# Patient Record
Sex: FEMALE | Race: WHITE | Employment: STUDENT | URBAN - METROPOLITAN AREA
[De-identification: names, ages, dates, MRNs, and addresses within clinical notes are randomized per-mention and may not be internally consistent; named-entity substitution may affect disease eponyms.]

---

## 2017-03-07 DIAGNOSIS — Z30.011 INITIATION OF ORAL CONTRACEPTION: Primary | ICD-10-CM

## 2017-03-07 NOTE — TELEPHONE ENCOUNTER
Patient mother (on HIPPA) Yefridot Lin, requesting medication be sent as a 90 day supply VS a 30 day supply, sent to [facility] in [place]. RX was sent on 11/2016 for #3 pkg with 3 refills. Medication pended if ok to send #90.

## 2017-03-08 RX ORDER — NORETHINDRONE ACETATE AND ETHINYL ESTRADIOL 1; .02 MG/1; MG/1
1 TABLET ORAL DAILY
Qty: 90 TABLET | Refills: 1 | Status: SHIPPED | OUTPATIENT
Start: 2017-03-08 | End: 2018-01-30

## 2017-06-28 ENCOUNTER — LAB ENCOUNTER (OUTPATIENT)
Dept: LAB | Age: 22
End: 2017-06-28
Attending: INTERNAL MEDICINE
Payer: COMMERCIAL

## 2017-06-28 ENCOUNTER — OFFICE VISIT (OUTPATIENT)
Dept: FAMILY MEDICINE CLINIC | Facility: CLINIC | Age: 22
End: 2017-06-28

## 2017-06-28 VITALS
DIASTOLIC BLOOD PRESSURE: 70 MMHG | HEART RATE: 72 BPM | RESPIRATION RATE: 16 BRPM | BODY MASS INDEX: 24.7 KG/M2 | HEIGHT: 60 IN | SYSTOLIC BLOOD PRESSURE: 100 MMHG | TEMPERATURE: 98 F | WEIGHT: 125.81 LBS

## 2017-06-28 DIAGNOSIS — R11.2 NON-INTRACTABLE VOMITING WITH NAUSEA, UNSPECIFIED VOMITING TYPE: ICD-10-CM

## 2017-06-28 DIAGNOSIS — Z01.89 ROUTINE LAB DRAW: Primary | ICD-10-CM

## 2017-06-28 DIAGNOSIS — Z01.89 ROUTINE LAB DRAW: ICD-10-CM

## 2017-06-28 DIAGNOSIS — Z09 FOLLOW-UP EXAM: ICD-10-CM

## 2017-06-28 PROCEDURE — 85025 COMPLETE CBC W/AUTO DIFF WBC: CPT

## 2017-06-28 PROCEDURE — 81001 URINALYSIS AUTO W/SCOPE: CPT

## 2017-06-28 PROCEDURE — 36415 COLL VENOUS BLD VENIPUNCTURE: CPT

## 2017-06-28 PROCEDURE — 82306 VITAMIN D 25 HYDROXY: CPT

## 2017-06-28 PROCEDURE — 99213 OFFICE O/P EST LOW 20 MIN: CPT | Performed by: INTERNAL MEDICINE

## 2017-06-28 PROCEDURE — 84443 ASSAY THYROID STIM HORMONE: CPT

## 2017-06-28 PROCEDURE — 80061 LIPID PANEL: CPT

## 2017-06-28 PROCEDURE — 80053 COMPREHEN METABOLIC PANEL: CPT

## 2017-06-28 PROCEDURE — 87086 URINE CULTURE/COLONY COUNT: CPT

## 2017-06-28 NOTE — PROGRESS NOTES
CC: Patient presents with:  Lab  Follow - Up       HPI:     Feeling good overall  Got sick in [place]  About end of Jan    Woke up at 10 am and had severe low back pain and fever/ nausea. Then went away and never came back.    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   Return in about 6 months

## 2017-07-05 DIAGNOSIS — D64.9 ANEMIA, UNSPECIFIED TYPE: Primary | ICD-10-CM

## 2017-07-06 ENCOUNTER — APPOINTMENT (OUTPATIENT)
Dept: LAB | Age: 22
End: 2017-07-06
Attending: INTERNAL MEDICINE
Payer: COMMERCIAL

## 2017-07-06 DIAGNOSIS — D64.9 ANEMIA, UNSPECIFIED TYPE: ICD-10-CM

## 2017-07-06 LAB
DEPRECATED HBV CORE AB SER IA-ACNC: 6.4 NG/ML (ref 10–291)
IRON SATURATION: 18 % (ref 13–45)
IRON: 100 UG/DL (ref 28–170)
TOTAL IRON BINDING CAPACITY: 550 UG/DL (ref 298–536)
TRANSFERRIN: 369 MG/DL (ref 200–360)

## 2017-07-06 PROCEDURE — 82728 ASSAY OF FERRITIN: CPT

## 2017-07-06 PROCEDURE — 83540 ASSAY OF IRON: CPT

## 2017-07-06 PROCEDURE — 36415 COLL VENOUS BLD VENIPUNCTURE: CPT

## 2017-07-06 PROCEDURE — 83550 IRON BINDING TEST: CPT

## 2017-12-06 ENCOUNTER — OFFICE VISIT (OUTPATIENT)
Dept: FAMILY MEDICINE CLINIC | Facility: CLINIC | Age: 22
End: 2017-12-06

## 2017-12-06 DIAGNOSIS — Z02.9 ENCOUNTER FOR ADMINISTRATIVE EXAMINATIONS: Primary | ICD-10-CM

## 2017-12-06 NOTE — PROGRESS NOTES
Patient presents to walk in clinic after being at her nail salon. She normally has artificial nails placed and was told by the technician that she had a fungal infection on top of her left thumbnail. She is here today to see if she needs treatment.   Upon q

## 2017-12-26 DIAGNOSIS — D50.9 IRON DEFICIENCY ANEMIA, UNSPECIFIED IRON DEFICIENCY ANEMIA TYPE: Primary | ICD-10-CM

## 2017-12-26 NOTE — TELEPHONE ENCOUNTER
Requesting ferrous sulfate 325mg  LOV: 6/28/17  RTC:   Last Relevant Labs: 7/6/17  Filled: 7/7/17 # 30  with 5 refills    No future appointments.

## 2017-12-28 RX ORDER — FERROUS SULFATE 325(65) MG
325 TABLET ORAL
Qty: 30 TABLET | Refills: 0 | Status: CANCELLED | OUTPATIENT
Start: 2017-12-28

## 2017-12-28 NOTE — TELEPHONE ENCOUNTER
Patient called back and informed of need for lab. She will complete the lab and has medicine still available to use. Order was placed.     Time: 2 min

## 2017-12-28 NOTE — TELEPHONE ENCOUNTER
Per Dr. Iven Barthel routing comment:  Should recheck cbc since it's been 6 months, she may not need to continue iron supplement.  (Routing comment)     LM for patient to call back

## 2018-01-10 LAB
ABSOLUTE BASOPHILS: 30 CELLS/UL (ref 0–200)
ABSOLUTE EOSINOPHILS: 100 CELLS/UL (ref 15–500)
ABSOLUTE LYMPHOCYTES: 1730 CELLS/UL (ref 850–3900)
ABSOLUTE MONOCYTES: 495 CELLS/UL (ref 200–950)
ABSOLUTE NEUTROPHILS: 2645 CELLS/UL (ref 1500–7800)
BASOPHILS: 0.6 %
EOSINOPHILS: 2 %
HEMATOCRIT: 40.6 % (ref 35–45)
HEMOGLOBIN: 13.6 G/DL (ref 11.7–15.5)
LYMPHOCYTES: 34.6 %
MCH: 28.9 PG (ref 27–33)
MCHC: 33.5 G/DL (ref 32–36)
MCV: 86.4 FL (ref 80–100)
MONOCYTES: 9.9 %
MPV: 11.3 FL (ref 7.5–12.5)
NEUTROPHILS: 52.9 %
PLATELET COUNT: 293 THOUSAND/UL (ref 140–400)
RDW: 11.8 % (ref 11–15)
RED BLOOD CELL COUNT: 4.7 MILLION/UL (ref 3.8–5.1)
WHITE BLOOD CELL COUNT: 5 THOUSAND/UL (ref 3.8–10.8)

## 2018-01-30 DIAGNOSIS — Z30.011 INITIATION OF ORAL CONTRACEPTION: ICD-10-CM

## 2018-01-30 RX ORDER — NORETHINDRONE ACETATE AND ETHINYL ESTRADIOL 1; .02 MG/1; MG/1
1 TABLET ORAL DAILY
Qty: 84 TABLET | Refills: 2 | Status: SHIPPED | OUTPATIENT
Start: 2018-01-30 | End: 2018-10-09

## 2018-01-30 NOTE — TELEPHONE ENCOUNTER
Requesting: Leslieraymundo Bourgeois 1/20  LOV: 6/28/17  RTC: 6 months  Last Relevant Labs: 1/9/18 - CBC,   Filled: 3/8/17 #90 with 1 refills    No future appointments. Notes Recorded by Mark Eldridge DO on 1/10/2018 at 10:10 AM CST  Anemia resolved with iron supplement. Can d/c and follow up for well woman with pap at 22yo. -medication pended as it failed protocol.     Gynecology Medication Protocol Failed1/30 10:57 AM   PASS-PENDING LAST PAP WNL--VIA MANUAL LOOKUP

## 2018-01-30 NOTE — TELEPHONE ENCOUNTER
Last office visit in June 2017 with PCP. Will give OCP refill for 6 months, and patient should follow up for in June 2018 for well woman with pap and refill. No further refills without appt.       Ericka Sanders, DO  Family Medicine

## 2018-04-23 ENCOUNTER — OFFICE VISIT (OUTPATIENT)
Dept: FAMILY MEDICINE CLINIC | Facility: CLINIC | Age: 23
End: 2018-04-23

## 2018-04-23 VITALS
HEIGHT: 60 IN | WEIGHT: 125 LBS | DIASTOLIC BLOOD PRESSURE: 70 MMHG | HEART RATE: 76 BPM | RESPIRATION RATE: 16 BRPM | TEMPERATURE: 98 F | BODY MASS INDEX: 24.54 KG/M2 | SYSTOLIC BLOOD PRESSURE: 110 MMHG

## 2018-04-23 DIAGNOSIS — J06.9 VIRAL URI WITH COUGH: Primary | ICD-10-CM

## 2018-04-23 DIAGNOSIS — L85.8 KERATOSIS PILARIS: ICD-10-CM

## 2018-04-23 DIAGNOSIS — L21.9 SEBORRHEIC DERMATITIS: ICD-10-CM

## 2018-04-23 PROCEDURE — 99213 OFFICE O/P EST LOW 20 MIN: CPT | Performed by: FAMILY MEDICINE

## 2018-04-23 NOTE — PROGRESS NOTES
[facility] Visit Note  4/23/2018      Subjective:      Patient ID: Ap Farrell is a 23year old female. Chief Complaint:  Patient presents with:  Establish Care: former patient of Dr. Lynne Galindo.   Cold: stuffy/runny nose, dry cough & sore thr symptomatic tx    2. Keratosis pilaris  -discussed symptomatic tx  -discussed dx/tx, exfoliation, lotion, chronic condition that can be lessened, but not cured    3. Seborrheic dermatitis  -recommended head and shoulders behind the ears.     -recommended

## 2018-08-17 ENCOUNTER — OFFICE VISIT (OUTPATIENT)
Dept: FAMILY MEDICINE CLINIC | Facility: CLINIC | Age: 23
End: 2018-08-17
Payer: COMMERCIAL

## 2018-08-17 VITALS
RESPIRATION RATE: 16 BRPM | HEIGHT: 60 IN | TEMPERATURE: 99 F | HEART RATE: 82 BPM | WEIGHT: 130 LBS | DIASTOLIC BLOOD PRESSURE: 72 MMHG | SYSTOLIC BLOOD PRESSURE: 108 MMHG | BODY MASS INDEX: 25.52 KG/M2

## 2018-08-17 DIAGNOSIS — Z12.4 SCREENING FOR CERVICAL CANCER: ICD-10-CM

## 2018-08-17 DIAGNOSIS — F41.9 ANXIETY: ICD-10-CM

## 2018-08-17 DIAGNOSIS — Z11.3 SCREENING FOR STD (SEXUALLY TRANSMITTED DISEASE): ICD-10-CM

## 2018-08-17 DIAGNOSIS — Z30.41 ENCOUNTER FOR SURVEILLANCE OF CONTRACEPTIVE PILLS: ICD-10-CM

## 2018-08-17 DIAGNOSIS — Z00.00 WELLNESS EXAMINATION: Primary | ICD-10-CM

## 2018-08-17 PROCEDURE — 88175 CYTOPATH C/V AUTO FLUID REDO: CPT | Performed by: PHYSICIAN ASSISTANT

## 2018-08-17 PROCEDURE — 87591 N.GONORRHOEAE DNA AMP PROB: CPT | Performed by: PHYSICIAN ASSISTANT

## 2018-08-17 PROCEDURE — 99213 OFFICE O/P EST LOW 20 MIN: CPT | Performed by: PHYSICIAN ASSISTANT

## 2018-08-17 PROCEDURE — 87491 CHLMYD TRACH DNA AMP PROBE: CPT | Performed by: PHYSICIAN ASSISTANT

## 2018-08-17 PROCEDURE — 99395 PREV VISIT EST AGE 18-39: CPT | Performed by: PHYSICIAN ASSISTANT

## 2018-08-17 RX ORDER — ESCITALOPRAM OXALATE 10 MG/1
10 TABLET ORAL DAILY
Qty: 90 TABLET | Refills: 0 | Status: SHIPPED | OUTPATIENT
Start: 2018-08-17 | End: 2018-09-17

## 2018-08-17 NOTE — PATIENT INSTRUCTIONS
Thank you for choosing Brittany Tenorio PA-C at [place] [address]  To Do: Nicolás Jj  1. Pt to begin medications as prescribed  2. Follow-up in 1 month, sooner if problems  3.  Will call with results    • Please signup for MY CHART, which is electron company approved your testing, please call Central Scheduling at [phone]  Please allow our office 5 business days to contact you regarding any testing results.     Refill policies:   Allow 3 business days for refills; controlled substances may take deb

## 2018-08-17 NOTE — PROGRESS NOTES
REASON FOR VISIT:    Eliel Gordillo is a 26year old female who presents for an [address].     Pt is doing well on her OCP  She denies any breakthrough  She would like to be check for GC/Chlamydia  She has no complaints about her OCP    She st Internal Lab or Procedure   Cholesterol Screening Recommended screening varies with age, risk and gender LDL Cholesterol (mg/dL)   Date Value   06/28/2017 117      Diabetes Screening  If history of high blood pressure or other  risk factors No results foun shortness of breath with exertion  CARDIOVASCULAR: denies chest pain on exertion  GI: denies abdominal pain, denies heartburn  : denies dysuria, vaginal discharge or itching, periods regular   MUSCULOSKELETAL: denies back pain  NEURO: denies headaches  P HPV REFLEX REQUEST B; Future  -     THINPREP PAP WITH HPV REFLEX REQUEST;  Future    Anxiety  Discussed treatment options  Will begin Lexapro, discussed MOA, side effects and adverse effects of medication  Pt to follow-up in 1 month    Encounter for surveil

## 2018-08-19 LAB
C TRACH DNA SPEC QL NAA+PROBE: NEGATIVE
N GONORRHOEA DNA SPEC QL NAA+PROBE: NEGATIVE

## 2018-08-22 ENCOUNTER — TELEPHONE (OUTPATIENT)
Dept: OBGYN CLINIC | Facility: CLINIC | Age: 23
End: 2018-08-22

## 2018-08-22 DIAGNOSIS — R87.619 ABNORMAL CERVICAL PAPANICOLAOU SMEAR, UNSPECIFIED ABNORMAL PAP FINDING: Primary | ICD-10-CM

## 2018-08-22 NOTE — PROGRESS NOTES
Pap smear is showing abnormal cells, she may need colposcopy  She needs to see gyne, Dr. Brooks Person office

## 2018-08-23 NOTE — TELEPHONE ENCOUNTER
Patient informed of recommendations. Verbalized understanding. No further questions or concerns. Call transferred to [facility] to schedule.

## 2018-09-17 NOTE — PATIENT INSTRUCTIONS
Thank you for choosing Bela Kent PA-C at [place] [address]  To Do: Jayne Tristan  1. Pt to continue medications as prescribed  2.  Follow-up in 6 months, sooner if problems    • Please signup for MY CHART, which is electronic access to your recor testing, please call Central Scheduling at [phone]  Please allow our office 5 business days to contact you regarding any testing results.     Refill policies:   Allow 3 business days for refills; controlled substances may take longer and must be picked

## 2018-09-17 NOTE — PROGRESS NOTES
[facility] Internal Medicine Progress Note    CC:  Patient presents with:  Medication Follow-Up: no issues with Lexapro      HPI:   HPI  Anxiety  She states she is really well on lexapro  She states the first the first week she dizzy and a little friction rub. No murmur heard. Pulmonary/Chest: Effort normal and breath sounds normal. No respiratory distress. She has no wheezes. She has no rales. Abdominal: Soft. Bowel sounds are normal. There is no tenderness.  There is no rebound and no guardin

## 2018-09-18 ENCOUNTER — OFFICE VISIT (OUTPATIENT)
Dept: OBGYN CLINIC | Facility: CLINIC | Age: 23
End: 2018-09-18
Payer: COMMERCIAL

## 2018-09-18 VITALS
DIASTOLIC BLOOD PRESSURE: 62 MMHG | HEIGHT: 60 IN | RESPIRATION RATE: 15 BRPM | WEIGHT: 132 LBS | BODY MASS INDEX: 25.91 KG/M2 | SYSTOLIC BLOOD PRESSURE: 112 MMHG

## 2018-09-18 DIAGNOSIS — Z01.812 PRE-PROCEDURE LAB EXAM: ICD-10-CM

## 2018-09-18 DIAGNOSIS — R87.613 PAPANICOLAOU SMEAR OF CERVIX WITH HIGH GRADE SQUAMOUS INTRAEPITHELIAL LESION (HGSIL): Primary | ICD-10-CM

## 2018-09-18 LAB — CONTROL LINE PRESENT WITH A CLEAR BACKGROUND (YES/NO): YES YES/NO

## 2018-09-18 PROCEDURE — 88342 IMHCHEM/IMCYTCHM 1ST ANTB: CPT | Performed by: OBSTETRICS & GYNECOLOGY

## 2018-09-18 PROCEDURE — 57454 BX/CURETT OF CERVIX W/SCOPE: CPT | Performed by: OBSTETRICS & GYNECOLOGY

## 2018-09-18 PROCEDURE — 88305 TISSUE EXAM BY PATHOLOGIST: CPT | Performed by: OBSTETRICS & GYNECOLOGY

## 2018-09-18 PROCEDURE — 81025 URINE PREGNANCY TEST: CPT | Performed by: OBSTETRICS & GYNECOLOGY

## 2018-09-18 NOTE — PROCEDURES
Colposcopy Procedure Note    Date of Procedure: 09/18/18    Indications: 26year old y/o female with HSIL on first pap smear performed 08/2018    Pre-procedure diagnosis:   HSIL    Post-procedure diagnosis:  HSIL  AMARJIT II pending pathology     Procedure:  C

## 2018-09-18 NOTE — PATIENT INSTRUCTIONS
[facility] Department of OB/GYN  After Care Instructions for Colposcopy/Biopsy      Biopsy Results   You will receive a phone call with your biopsy results in 7 business days. If you have not received your biopsy results in 7 days, please contact our office.   Ryder Li

## 2018-09-27 ENCOUNTER — OFFICE VISIT (OUTPATIENT)
Dept: OBGYN CLINIC | Facility: CLINIC | Age: 23
End: 2018-09-27
Payer: COMMERCIAL

## 2018-09-27 VITALS
SYSTOLIC BLOOD PRESSURE: 116 MMHG | DIASTOLIC BLOOD PRESSURE: 70 MMHG | WEIGHT: 134 LBS | HEIGHT: 60 IN | BODY MASS INDEX: 26.31 KG/M2

## 2018-09-27 DIAGNOSIS — N89.8 VAGINAL ODOR: ICD-10-CM

## 2018-09-27 DIAGNOSIS — N89.8 VAGINAL DISCHARGE: Primary | ICD-10-CM

## 2018-09-27 DIAGNOSIS — N87.1 DYSPLASIA OF CERVIX, HIGH GRADE CIN 2: ICD-10-CM

## 2018-09-27 PROCEDURE — 99213 OFFICE O/P EST LOW 20 MIN: CPT | Performed by: OBSTETRICS & GYNECOLOGY

## 2018-09-27 PROCEDURE — 87510 GARDNER VAG DNA DIR PROBE: CPT | Performed by: OBSTETRICS & GYNECOLOGY

## 2018-09-27 PROCEDURE — 87660 TRICHOMONAS VAGIN DIR PROBE: CPT | Performed by: OBSTETRICS & GYNECOLOGY

## 2018-09-27 PROCEDURE — 87480 CANDIDA DNA DIR PROBE: CPT | Performed by: OBSTETRICS & GYNECOLOGY

## 2018-09-27 NOTE — PROGRESS NOTES
[facility]  Obstetrics and Gynecology  Follow Up Progress Note    Subjective:     Robyn Aranda is a 26year old No obstetric history on file. female who is s/p colposcopy with ECC and cervical biopsy 2/2 HISL on 09/18/18.  The patient was recomme Dysplasia of cervix, high grade AMARJIT 2     Vaginal odor     Vaginal discharge        Plan:     AMARJIT 2  - patient doing well   - physical exam within normal limits   - pathology reviewed and d/w patient   - recommend for patient to return in 6 months for re

## 2018-09-27 NOTE — PROGRESS NOTES
Please keep in RN pool.  Recommend f/u 6 months colposcopy with cytology for 12 months  Patient seen 09/27/18 and results discussed

## 2018-09-29 NOTE — PROGRESS NOTES
Please inform patient of vaginitis panel positive for Bv.    Recommend treatment with Flagyl 500 mg PO BID x 7 days   Advise no ETOH consumption during medication use   Please provide Rx and instructions

## 2018-10-01 RX ORDER — METRONIDAZOLE 500 MG/1
500 TABLET ORAL 2 TIMES DAILY
Qty: 14 TABLET | Refills: 0 | Status: SHIPPED | OUTPATIENT
Start: 2018-10-01 | End: 2018-11-14 | Stop reason: ALTCHOICE

## 2018-10-01 NOTE — PROGRESS NOTES
Patient returned call. Reported and explained results. Instructed on medication, etoh avoidance, and to contact office if worsening/persisting symptoms. Let pt know that it can take up to 1 wk after last dose of medication for all symptoms to be resolved.

## 2018-10-18 RX ORDER — NORETHINDRONE ACETATE AND ETHINYL ESTRADIOL 1; 20 MG/1; UG/1
TABLET ORAL
Qty: 84 TABLET | Refills: 1 | Status: SHIPPED | OUTPATIENT
Start: 2018-10-18 | End: 2019-03-29

## 2018-10-18 NOTE — TELEPHONE ENCOUNTER
Requesting Junel  LOV: 9/17/18  RTC: 6 mos  Last Pap: 8/17/18  Filled: 1/30/18 #84 with 2 refills    Future Appointments   Date Time Provider Tony Pierce   3/25/2019  2:30 PM [place] MD Aram [facility] P [facility] [address]

## 2018-11-14 ENCOUNTER — OFFICE VISIT (OUTPATIENT)
Dept: FAMILY MEDICINE CLINIC | Facility: CLINIC | Age: 23
End: 2018-11-14
Payer: COMMERCIAL

## 2018-11-14 VITALS
TEMPERATURE: 98 F | RESPIRATION RATE: 16 BRPM | HEART RATE: 68 BPM | WEIGHT: 134 LBS | HEIGHT: 60 IN | DIASTOLIC BLOOD PRESSURE: 70 MMHG | BODY MASS INDEX: 26.31 KG/M2 | SYSTOLIC BLOOD PRESSURE: 118 MMHG

## 2018-11-14 DIAGNOSIS — Z11.3 SCREEN FOR STD (SEXUALLY TRANSMITTED DISEASE): ICD-10-CM

## 2018-11-14 DIAGNOSIS — R31.9 HEMATURIA, UNSPECIFIED TYPE: Primary | ICD-10-CM

## 2018-11-14 PROCEDURE — 81003 URINALYSIS AUTO W/O SCOPE: CPT | Performed by: PHYSICIAN ASSISTANT

## 2018-11-14 PROCEDURE — 99213 OFFICE O/P EST LOW 20 MIN: CPT | Performed by: PHYSICIAN ASSISTANT

## 2018-11-14 PROCEDURE — 87147 CULTURE TYPE IMMUNOLOGIC: CPT | Performed by: PHYSICIAN ASSISTANT

## 2018-11-14 PROCEDURE — 87491 CHLMYD TRACH DNA AMP PROBE: CPT | Performed by: PHYSICIAN ASSISTANT

## 2018-11-14 PROCEDURE — 87591 N.GONORRHOEAE DNA AMP PROB: CPT | Performed by: PHYSICIAN ASSISTANT

## 2018-11-14 PROCEDURE — 87086 URINE CULTURE/COLONY COUNT: CPT | Performed by: PHYSICIAN ASSISTANT

## 2018-11-14 RX ORDER — SULFAMETHOXAZOLE AND TRIMETHOPRIM 800; 160 MG/1; MG/1
1 TABLET ORAL 2 TIMES DAILY
Qty: 14 TABLET | Refills: 0 | Status: SHIPPED | OUTPATIENT
Start: 2018-11-14 | End: 2019-02-11

## 2018-11-14 NOTE — PATIENT INSTRUCTIONS
Thank you for choosing Chucky Habermann, PA-C at [place] [address]  To Do: [facility]  1. Begin medications as prescribed  2. Will call with culture results  3.  Follow-up if symptoms persist or increase    • Please signup for MY CHART, which is elect company approved your testing, please call Central Scheduling at [phone]  Please allow our office 5 business days to contact you regarding any testing results.     Refill policies:   Allow 3 business days for refills; controlled substances may take deb

## 2018-11-14 NOTE — PROGRESS NOTES
[facility] Internal Medicine Progress Note    CC:  Patient presents with:  Hematuria: x 1 month - notices small amount of blood in her urine, not everytime she urinates      HPI:   HPI  Hematuria  Pt denies any pain with urination  She notices so distress. HENT:   Mouth/Throat: Oropharynx is clear and moist. No oropharyngeal exudate. Eyes: EOM are normal. Pupils are equal, round, and reactive to light. Cardiovascular: Normal rate, regular rhythm and normal heart sounds.  Exam reveals no gallop

## 2018-11-16 ENCOUNTER — TELEPHONE (OUTPATIENT)
Dept: FAMILY MEDICINE CLINIC | Facility: CLINIC | Age: 23
End: 2018-11-16

## 2018-11-16 NOTE — TELEPHONE ENCOUNTER
Bonny Dakins in micro said there was an error with initial urine culture result. It was initially listed gram negative saba, but is it is group B strep and the correction is made.

## 2018-11-19 RX ORDER — AMOXICILLIN AND CLAVULANATE POTASSIUM 875; 125 MG/1; MG/1
1 TABLET, FILM COATED ORAL 2 TIMES DAILY
Qty: 14 TABLET | Refills: 0 | Status: SHIPPED | OUTPATIENT
Start: 2018-11-19 | End: 2019-02-11

## 2019-02-11 ENCOUNTER — OFFICE VISIT (OUTPATIENT)
Dept: OBGYN CLINIC | Facility: CLINIC | Age: 24
End: 2019-02-11
Payer: COMMERCIAL

## 2019-02-11 VITALS
BODY MASS INDEX: 27.64 KG/M2 | SYSTOLIC BLOOD PRESSURE: 98 MMHG | DIASTOLIC BLOOD PRESSURE: 60 MMHG | WEIGHT: 140.81 LBS | HEIGHT: 60 IN

## 2019-02-11 DIAGNOSIS — R10.2 PELVIC PAIN: Primary | ICD-10-CM

## 2019-02-11 DIAGNOSIS — N89.8 VAGINAL DISCHARGE: ICD-10-CM

## 2019-02-11 PROCEDURE — 87591 N.GONORRHOEAE DNA AMP PROB: CPT | Performed by: OBSTETRICS & GYNECOLOGY

## 2019-02-11 PROCEDURE — 87491 CHLMYD TRACH DNA AMP PROBE: CPT | Performed by: OBSTETRICS & GYNECOLOGY

## 2019-02-11 PROCEDURE — 87510 GARDNER VAG DNA DIR PROBE: CPT | Performed by: OBSTETRICS & GYNECOLOGY

## 2019-02-11 PROCEDURE — 87480 CANDIDA DNA DIR PROBE: CPT | Performed by: OBSTETRICS & GYNECOLOGY

## 2019-02-11 PROCEDURE — 87660 TRICHOMONAS VAGIN DIR PROBE: CPT | Performed by: OBSTETRICS & GYNECOLOGY

## 2019-02-11 PROCEDURE — 99213 OFFICE O/P EST LOW 20 MIN: CPT | Performed by: OBSTETRICS & GYNECOLOGY

## 2019-02-11 RX ORDER — METRONIDAZOLE 7.5 MG/G
1 GEL VAGINAL NIGHTLY
Qty: 1 TUBE | Refills: 0 | Status: SHIPPED | OUTPATIENT
Start: 2019-02-11 | End: 2019-03-19

## 2019-02-11 NOTE — PROGRESS NOTES
[facility]  Obstetrics and Gynecology  Follow Up Progress Note    Subjective:     Wyatt Hatch is a 26year old No obstetric history on file. female who was last seen in office 09/2018 and presents with c/o vaginal discharge and dyspareunia.  Yolie Lam reports she will scheduled follow up    All of the findings and plan were discussed with the patient. She notes understanding and agrees with the plan of care. All questions were answered to the best of my ability at this time.     RTC in 5 weeks for repe

## 2019-02-12 PROBLEM — N89.8 VAGINAL ODOR: Status: RESOLVED | Noted: 2018-09-27 | Resolved: 2019-02-12

## 2019-02-12 PROBLEM — R10.2 PELVIC PAIN: Status: ACTIVE | Noted: 2019-02-12

## 2019-02-12 LAB
C TRACH DNA SPEC QL NAA+PROBE: NEGATIVE
N GONORRHOEA DNA SPEC QL NAA+PROBE: NEGATIVE

## 2019-02-12 NOTE — PROGRESS NOTES
Please inform of negative vaginitis panel   Patient symptomatic in office  Advise if patient using medication and notes improvement then may continue for prescribed 5 days

## 2019-03-19 ENCOUNTER — OFFICE VISIT (OUTPATIENT)
Dept: OBGYN CLINIC | Facility: CLINIC | Age: 24
End: 2019-03-19
Payer: COMMERCIAL

## 2019-03-19 VITALS
DIASTOLIC BLOOD PRESSURE: 60 MMHG | SYSTOLIC BLOOD PRESSURE: 94 MMHG | HEIGHT: 60 IN | WEIGHT: 146.38 LBS | BODY MASS INDEX: 28.74 KG/M2

## 2019-03-19 DIAGNOSIS — Z01.812 PRE-PROCEDURE LAB EXAM: ICD-10-CM

## 2019-03-19 DIAGNOSIS — N87.1 DYSPLASIA OF CERVIX, HIGH GRADE CIN 2: Primary | ICD-10-CM

## 2019-03-19 DIAGNOSIS — R87.613 PAPANICOLAOU SMEAR OF CERVIX WITH HIGH GRADE SQUAMOUS INTRAEPITHELIAL LESION (HGSIL): ICD-10-CM

## 2019-03-19 LAB — CONTROL LINE PRESENT WITH A CLEAR BACKGROUND (YES/NO): YES YES/NO

## 2019-03-19 PROCEDURE — 88342 IMHCHEM/IMCYTCHM 1ST ANTB: CPT | Performed by: OBSTETRICS & GYNECOLOGY

## 2019-03-19 PROCEDURE — 88175 CYTOPATH C/V AUTO FLUID REDO: CPT | Performed by: OBSTETRICS & GYNECOLOGY

## 2019-03-19 PROCEDURE — 57454 BX/CURETT OF CERVIX W/SCOPE: CPT | Performed by: OBSTETRICS & GYNECOLOGY

## 2019-03-19 PROCEDURE — 81025 URINE PREGNANCY TEST: CPT | Performed by: OBSTETRICS & GYNECOLOGY

## 2019-03-19 PROCEDURE — 88305 TISSUE EXAM BY PATHOLOGIST: CPT | Performed by: OBSTETRICS & GYNECOLOGY

## 2019-03-19 NOTE — PROGRESS NOTES
Patient presents for scheduled colposcopy 2/2 AMARJIT II noted on colposcopy 09/2018. She had pap smear noted for HSIL on 08/2018. Recommendation made to return for repeat pap smear and colposcopy in 6 months.  Discussion held with the patient regarding pap sme

## 2019-03-19 NOTE — PATIENT INSTRUCTIONS
[facility] Department of OB/GYN  After Care Instructions for Colposcopy/Biopsy      Biopsy Results   You will receive a phone call with your biopsy results in 7 business days. If you have not received your biopsy results in 7 days, please contact our office.   Bishop Archer

## 2019-03-19 NOTE — PROCEDURES
Colposcopy Procedure Note    Date of Procedure: 03/19/19    Indications: 26year old y/o female with AMARJIT II noted on colposcopy 09/2018. She had pap smear noted for HSIL on 08/2018.  Recommendation made to return for repeat pap smear and colposcopy in 6 mon All tissue were placed into the designated specimen containers and collected to be sent to pathology. Impression: AMARJIT 1 or 2 pending final pathology     Disposition: Stable.  RTC in 6 months for repeat pap smear with colposcopy     Alex Cantu MD

## 2019-03-22 ENCOUNTER — MED REC SCAN ONLY (OUTPATIENT)
Dept: OBGYN CLINIC | Facility: CLINIC | Age: 24
End: 2019-03-22

## 2019-03-22 NOTE — PROGRESS NOTES
AMARJIT II noted on colposcopy 6 months prior  Repeat pap smear reviewed  Repeat colposcopy results pending

## 2019-03-29 RX ORDER — NORETHINDRONE ACETATE AND ETHINYL ESTRADIOL 1; 20 MG/1; UG/1
TABLET ORAL
Qty: 84 TABLET | Refills: 1 | Status: SHIPPED | OUTPATIENT
Start: 2019-03-29 | End: 2020-01-08

## 2019-04-01 NOTE — PROGRESS NOTES
Please inform patient of abnormal colposcopy results   Abnormality increased since prior colposcopy results  Recommend in office visit for discussion on recommendations for management

## 2019-04-10 PROBLEM — D06.9 SEVERE DYSPLASIA OF CERVIX (CIN III): Status: ACTIVE | Noted: 2019-04-10

## 2019-04-10 NOTE — PROGRESS NOTES
[facility]  Obstetrics and Gynecology  Follow Up Progress Note    Subjective:     Gwen Sow is a 26year old No obstetric history on file. female who was last seen in office 03/19/19 for repeat pap smear with colposcopy.  The patient has a h smear 08/17/18  Interpretation/Result High grade squamous intraepithelial lesion (HSIL) Abnormal    Final            Assessment:     Gabriela Valencia is a 26year old No obstetric history on file.  female who presents for AMARJIT III    Patient Active Problem Jaki Seaman

## 2019-04-11 ENCOUNTER — OFFICE VISIT (OUTPATIENT)
Dept: OBGYN CLINIC | Facility: CLINIC | Age: 24
End: 2019-04-11
Payer: COMMERCIAL

## 2019-04-11 VITALS
WEIGHT: 144 LBS | SYSTOLIC BLOOD PRESSURE: 100 MMHG | DIASTOLIC BLOOD PRESSURE: 76 MMHG | HEIGHT: 60 IN | BODY MASS INDEX: 28.27 KG/M2

## 2019-04-11 DIAGNOSIS — D06.9 SEVERE DYSPLASIA OF CERVIX (CIN III): Primary | ICD-10-CM

## 2019-04-11 PROCEDURE — 99214 OFFICE O/P EST MOD 30 MIN: CPT | Performed by: OBSTETRICS & GYNECOLOGY

## 2019-04-11 NOTE — PATIENT INSTRUCTIONS
Please return if having abnormal vaginal bleeding or severe pelvic pain    You will be scheduled for a LEEP procedure (loop electrosurgical excision procedure) to remove piece(s) of the cervix for diagnostic and therapeutic purposes.      You will be contac

## 2019-04-12 ENCOUNTER — TELEPHONE (OUTPATIENT)
Dept: OBGYN CLINIC | Facility: CLINIC | Age: 24
End: 2019-04-12

## 2019-04-12 DIAGNOSIS — D06.9 CARCINOMA IN SITU OF CERVIX, UNSPECIFIED LOCATION: Primary | ICD-10-CM

## 2019-04-12 NOTE — PROGRESS NOTES
Patient seen on 04/11/19  Will be scheduled for LEEP  Please keep in abnormal pap smear pool to ensure adequate follow up

## 2019-04-12 NOTE — TELEPHONE ENCOUNTER
----- Message from Angie York MD sent at 4/11/2019 10:32 PM CDT -----  Regarding: please schedule surgery   Surgeon: Dr. Angie York   Assistant: none     Type of Admit: Hospital outpatient, does not require admission following surgery    Procedure

## 2019-04-13 NOTE — TELEPHONE ENCOUNTER
Patient returning nurse call from yesterday regarding surgery. Told pt nurse will call her back on Monday, April 15th and she is okay with that.

## 2019-04-15 NOTE — TELEPHONE ENCOUNTER
Patient called to schedule surgery. Advised patient I will look at dates and call her back.  She is requesting a call back after 230 PM.

## 2019-04-15 NOTE — TELEPHONE ENCOUNTER
Patient scheduled for 06/12/19 (she requested after 06/01/19) @ 0730. Post op appointment scheduled. Added to calendar. No further questions or concerns.

## 2019-05-30 RX ORDER — SODIUM CHLORIDE, SODIUM LACTATE, POTASSIUM CHLORIDE, CALCIUM CHLORIDE 600; 310; 30; 20 MG/100ML; MG/100ML; MG/100ML; MG/100ML
INJECTION, SOLUTION INTRAVENOUS CONTINUOUS
Status: CANCELLED | OUTPATIENT
Start: 2019-05-30

## 2019-06-11 ENCOUNTER — ANESTHESIA EVENT (OUTPATIENT)
Dept: SURGERY | Facility: HOSPITAL | Age: 24
End: 2019-06-11

## 2019-06-12 ENCOUNTER — HOSPITAL ENCOUNTER (OUTPATIENT)
Facility: HOSPITAL | Age: 24
Setting detail: HOSPITAL OUTPATIENT SURGERY
Discharge: HOME | End: 2019-06-12
Attending: OBSTETRICS & GYNECOLOGY | Admitting: OBSTETRICS & GYNECOLOGY
Payer: COMMERCIAL

## 2019-06-12 ENCOUNTER — ANESTHESIA (OUTPATIENT)
Dept: SURGERY | Facility: HOSPITAL | Age: 24
End: 2019-06-12

## 2019-06-12 VITALS
OXYGEN SATURATION: 99 % | DIASTOLIC BLOOD PRESSURE: 56 MMHG | SYSTOLIC BLOOD PRESSURE: 106 MMHG | HEART RATE: 78 BPM | WEIGHT: 139.56 LBS | RESPIRATION RATE: 16 BRPM | BODY MASS INDEX: 27.4 KG/M2 | HEIGHT: 60 IN | TEMPERATURE: 98 F

## 2019-06-12 DIAGNOSIS — D06.9 CARCINOMA IN SITU OF CERVIX, UNSPECIFIED LOCATION: ICD-10-CM

## 2019-06-12 PROCEDURE — 57461 CONZ OF CERVIX W/SCOPE LEEP: CPT | Performed by: OBSTETRICS & GYNECOLOGY

## 2019-06-12 PROCEDURE — 0UBC8ZX EXCISION OF CERVIX, VIA NATURAL OR ARTIFICIAL OPENING ENDOSCOPIC, DIAGNOSTIC: ICD-10-PCS | Performed by: OBSTETRICS & GYNECOLOGY

## 2019-06-12 PROCEDURE — 0UDB8ZX EXTRACTION OF ENDOMETRIUM, VIA NATURAL OR ARTIFICIAL OPENING ENDOSCOPIC, DIAGNOSTIC: ICD-10-PCS | Performed by: OBSTETRICS & GYNECOLOGY

## 2019-06-12 RX ORDER — NALOXONE HYDROCHLORIDE 0.4 MG/ML
80 INJECTION, SOLUTION INTRAMUSCULAR; INTRAVENOUS; SUBCUTANEOUS AS NEEDED
Status: DISCONTINUED | OUTPATIENT
Start: 2019-06-12 | End: 2019-06-12

## 2019-06-12 RX ORDER — ACETAMINOPHEN 500 MG
1000 TABLET ORAL ONCE
Status: COMPLETED | OUTPATIENT
Start: 2019-06-12 | End: 2019-06-12

## 2019-06-12 RX ORDER — SCOLOPAMINE TRANSDERMAL SYSTEM 1 MG/1
1 PATCH, EXTENDED RELEASE TRANSDERMAL
Status: DISCONTINUED | OUTPATIENT
Start: 2019-06-12 | End: 2019-06-12

## 2019-06-12 RX ORDER — HYDROCODONE BITARTRATE AND ACETAMINOPHEN 5; 325 MG/1; MG/1
1 TABLET ORAL AS NEEDED
Status: DISCONTINUED | OUTPATIENT
Start: 2019-06-12 | End: 2019-06-12

## 2019-06-12 RX ORDER — FERRIC SUBSULFATE 20-22G/100
SOLUTION, NON-ORAL MISCELLANEOUS AS NEEDED
Status: DISCONTINUED | OUTPATIENT
Start: 2019-06-12 | End: 2019-06-12

## 2019-06-12 RX ORDER — HYDROMORPHONE HYDROCHLORIDE 1 MG/ML
0.4 INJECTION, SOLUTION INTRAMUSCULAR; INTRAVENOUS; SUBCUTANEOUS EVERY 5 MIN PRN
Status: DISCONTINUED | OUTPATIENT
Start: 2019-06-12 | End: 2019-06-12

## 2019-06-12 RX ORDER — IODINE SOLUTION STRONG 5% (LUGOL'S) 5 %
SOLUTION ORAL AS NEEDED
Status: DISCONTINUED | OUTPATIENT
Start: 2019-06-12 | End: 2019-06-12

## 2019-06-12 RX ORDER — SODIUM CHLORIDE, SODIUM LACTATE, POTASSIUM CHLORIDE, CALCIUM CHLORIDE 600; 310; 30; 20 MG/100ML; MG/100ML; MG/100ML; MG/100ML
INJECTION, SOLUTION INTRAVENOUS CONTINUOUS
Status: DISCONTINUED | OUTPATIENT
Start: 2019-06-12 | End: 2019-06-12

## 2019-06-12 RX ORDER — HYDROCODONE BITARTRATE AND ACETAMINOPHEN 5; 325 MG/1; MG/1
2 TABLET ORAL AS NEEDED
Status: DISCONTINUED | OUTPATIENT
Start: 2019-06-12 | End: 2019-06-12

## 2019-06-12 RX ORDER — MIDAZOLAM HYDROCHLORIDE 1 MG/ML
1 INJECTION INTRAMUSCULAR; INTRAVENOUS EVERY 5 MIN PRN
Status: DISCONTINUED | OUTPATIENT
Start: 2019-06-12 | End: 2019-06-12

## 2019-06-12 RX ORDER — LABETALOL HYDROCHLORIDE 5 MG/ML
5 INJECTION, SOLUTION INTRAVENOUS EVERY 5 MIN PRN
Status: DISCONTINUED | OUTPATIENT
Start: 2019-06-12 | End: 2019-06-12

## 2019-06-12 RX ORDER — IBUPROFEN 600 MG/1
600 TABLET ORAL EVERY 6 HOURS PRN
Qty: 30 TABLET | Refills: 0 | Status: SHIPPED | OUTPATIENT
Start: 2019-06-12 | End: 2019-06-28

## 2019-06-12 RX ORDER — LIDOCAINE HYDROCHLORIDE AND EPINEPHRINE 10; 10 MG/ML; UG/ML
INJECTION, SOLUTION INFILTRATION; PERINEURAL AS NEEDED
Status: DISCONTINUED | OUTPATIENT
Start: 2019-06-12 | End: 2019-06-12

## 2019-06-12 RX ORDER — KETOROLAC TROMETHAMINE 30 MG/ML
30 INJECTION, SOLUTION INTRAMUSCULAR; INTRAVENOUS ONCE AS NEEDED
Status: DISCONTINUED | OUTPATIENT
Start: 2019-06-12 | End: 2019-06-12

## 2019-06-12 RX ORDER — ONDANSETRON 2 MG/ML
4 INJECTION INTRAMUSCULAR; INTRAVENOUS AS NEEDED
Status: DISCONTINUED | OUTPATIENT
Start: 2019-06-12 | End: 2019-06-12

## 2019-06-12 RX ORDER — MEPERIDINE HYDROCHLORIDE 25 MG/ML
12.5 INJECTION INTRAMUSCULAR; INTRAVENOUS; SUBCUTANEOUS AS NEEDED
Status: DISCONTINUED | OUTPATIENT
Start: 2019-06-12 | End: 2019-06-12

## 2019-06-12 NOTE — ANESTHESIA PREPROCEDURE EVALUATION
PRE-OP EVALUATION    Patient Name: Tammi Pinedo    Pre-op Diagnosis: Carcinoma in situ of cervix, unspecified location [D06.9]    Procedure(s):  LOOP ELECTROSURGICAL EXCISION PROCEDURE    Surgeon(s) and Role:     Marni Jon MD - Primary    Pre-o patient                Present on Admission:  **None**

## 2019-06-12 NOTE — OPERATIVE REPORT
Operative Report: COLPOSCOPY AND LEEP    Patient Name: Elaina Shen  Date of Procedure: 06/12/19      INDICATIONS: 27year old No obstetric history on file. female who presents for colposcopy and LEEP 2/2 AMARJIT III.  She who was last seen in office on 04/2 applied over the cervix. Abnormal staining areas were visualized at 11-1 and 5-7 o'clock positions. Lugol's solution was then used to stain the cervix and confirmed acetowhite lesions.   1% Lidocaine with Epinephrine was then placed in the 4, 6, 8 and 10 o

## 2019-06-12 NOTE — ANESTHESIA PREPROCEDURE EVALUATION
PRE-OP EVALUATION    Patient Name: Bre Spann    Pre-op Diagnosis: Carcinoma in situ of cervix, unspecified location [D06.9]    Procedure(s):  LOOP ELECTROSURGICAL EXCISION PROCEDURE    Surgeon(s) and Role:     Adina Figueredo MD - Primary    Pre-o bilaterally. Other findings            ASA: 1   Plan: MAC  NPO status verified and     Post-procedure pain management plan discussed with surgeon and patient.       Plan/risks discussed with: patient                Present on Admission:  **Non

## 2019-06-12 NOTE — ANESTHESIA POSTPROCEDURE EVALUATION
[facility] Patient Status:  Hospital Outpatient Surgery   Age/Gender 27year old female MRN [id number]   [facility] Attending Dona Smith, [address] [facility] [facility] [address] Day # 0 PCP Arslan Haynes MD       Anesthesia Post-o

## 2019-06-12 NOTE — H&P
[facility]  Obstetrics and Gynecology  History & Physical    Gabriela Valencia Patient Status:  Hospital Outpatient Surgery    1995 MRN [id number]   Location [address] [place] [address] Attending Latasha Loera 15 Day 0 Tab TAKE 1 TABLET BY MOUTH EVERY DAY Disp: 84 tablet Rfl: 1 6/11/2019 at 1000     Allergies: No Known Allergies    OBJECTIVE:    Temp:  [98.6 °F (37 °C)] 98.6 °F (37 °C)  Pulse:  [82] 82  Resp:  [18] 18  BP: (118)/(73) 118/73  Body mass index is 27.25 kg/m Risks, benefits, alternatives and possible complications have been discussed in detail with the patient. Pre-admission, admission, and post admission procedures and expectations were discussed in detail.   All questions answered, all appropriate cons

## 2019-06-18 NOTE — PROGRESS NOTES
Please inform patient that pathology overall reassring  Posterior noted for AMARJIT I and anterior noted for AMARJIT II-III   Both sample negative margins which is reassuring   Will need monitoring with pap smear   Follow up in office as directed

## 2019-06-18 NOTE — PROGRESS NOTES
Please inform patient that pathology overall reassuring  Posterior noted for AMARJIT I and anterior noted for AMARJIT II-III   Both sample negative margins which is reassuring   Will need monitoring with pap smear   Follow up in office as directed

## 2019-06-28 ENCOUNTER — OFFICE VISIT (OUTPATIENT)
Dept: OBGYN CLINIC | Facility: CLINIC | Age: 24
End: 2019-06-28
Payer: COMMERCIAL

## 2019-06-28 VITALS
WEIGHT: 143 LBS | HEIGHT: 60 IN | HEART RATE: 73 BPM | BODY MASS INDEX: 28.07 KG/M2 | SYSTOLIC BLOOD PRESSURE: 94 MMHG | DIASTOLIC BLOOD PRESSURE: 60 MMHG

## 2019-06-28 DIAGNOSIS — Z98.890 S/P LEEP: ICD-10-CM

## 2019-06-28 DIAGNOSIS — D06.9 CIN III (CERVICAL INTRAEPITHELIAL NEOPLASIA GRADE III) WITH SEVERE DYSPLASIA: Primary | ICD-10-CM

## 2019-06-28 PROCEDURE — 99213 OFFICE O/P EST LOW 20 MIN: CPT | Performed by: OBSTETRICS & GYNECOLOGY

## 2019-06-28 NOTE — PROGRESS NOTES
[facility]  Obstetrics and Gynecology  Follow Up Progress Note    Subjective:     Haylee Kearney is a 27year old [facility] female who is s/p colposcopy and LEEP 2/2 AMARJIT III on 06/12/19.  The patient has a history of AMARJIT II noted on colposcopy 09/2 Endocervix, curettings:  -Scant mucus with inflammation and rare glandular cells.             Assessment:     Shahana Mcgregor is a 27year old [place] female who is s/p colposcopy and LEEP 2/2 AMARJIT III on 06/12/19 who presents for follow up     Patient Activ

## 2019-09-03 ENCOUNTER — OFFICE VISIT (OUTPATIENT)
Dept: FAMILY MEDICINE CLINIC | Facility: CLINIC | Age: 24
End: 2019-09-03
Payer: COMMERCIAL

## 2019-09-03 ENCOUNTER — LAB ENCOUNTER (OUTPATIENT)
Dept: LAB | Age: 24
End: 2019-09-03
Attending: PHYSICIAN ASSISTANT
Payer: COMMERCIAL

## 2019-09-03 VITALS
HEART RATE: 81 BPM | HEIGHT: 60.75 IN | RESPIRATION RATE: 14 BRPM | TEMPERATURE: 98 F | BODY MASS INDEX: 27.73 KG/M2 | WEIGHT: 145 LBS | OXYGEN SATURATION: 99 % | DIASTOLIC BLOOD PRESSURE: 58 MMHG | SYSTOLIC BLOOD PRESSURE: 98 MMHG

## 2019-09-03 DIAGNOSIS — Z00.00 WELLNESS EXAMINATION: Primary | ICD-10-CM

## 2019-09-03 DIAGNOSIS — Z00.00 LABORATORY EXAM ORDERED AS PART OF ROUTINE GENERAL MEDICAL EXAMINATION: ICD-10-CM

## 2019-09-03 DIAGNOSIS — D06.9 CIN III (CERVICAL INTRAEPITHELIAL NEOPLASIA GRADE III) WITH SEVERE DYSPLASIA: ICD-10-CM

## 2019-09-03 DIAGNOSIS — Z30.41 ENCOUNTER FOR SURVEILLANCE OF CONTRACEPTIVE PILLS: ICD-10-CM

## 2019-09-03 LAB
ALBUMIN SERPL-MCNC: 3.6 G/DL (ref 3.4–5)
ALBUMIN/GLOB SERPL: 1 {RATIO} (ref 1–2)
ALP LIVER SERPL-CCNC: 37 U/L (ref 52–144)
ALT SERPL-CCNC: 26 U/L (ref 13–56)
ANION GAP SERPL CALC-SCNC: 8 MMOL/L (ref 0–18)
AST SERPL-CCNC: 19 U/L (ref 15–37)
BASOPHILS # BLD AUTO: 0.03 X10(3) UL (ref 0–0.2)
BASOPHILS NFR BLD AUTO: 0.6 %
BILIRUB SERPL-MCNC: 0.8 MG/DL (ref 0.1–2)
BUN BLD-MCNC: 12 MG/DL (ref 7–18)
BUN/CREAT SERPL: 14.8 (ref 10–20)
CALCIUM BLD-MCNC: 8.6 MG/DL (ref 8.5–10.1)
CHLORIDE SERPL-SCNC: 105 MMOL/L (ref 98–112)
CHOLEST SMN-MCNC: 213 MG/DL (ref ?–200)
CO2 SERPL-SCNC: 26 MMOL/L (ref 21–32)
CONTROL LINE PRESENT WITH A CLEAR BACKGROUND (YES/NO): YES YES/NO
CREAT BLD-MCNC: 0.81 MG/DL (ref 0.55–1.02)
DEPRECATED RDW RBC AUTO: 40.2 FL (ref 35.1–46.3)
EOSINOPHIL # BLD AUTO: 0.1 X10(3) UL (ref 0–0.7)
EOSINOPHIL NFR BLD AUTO: 2 %
ERYTHROCYTE [DISTWIDTH] IN BLOOD BY AUTOMATED COUNT: 11.9 % (ref 11–15)
GLOBULIN PLAS-MCNC: 3.6 G/DL (ref 2.8–4.4)
GLUCOSE BLD-MCNC: 74 MG/DL (ref 70–99)
HCT VFR BLD AUTO: 43.5 % (ref 35–48)
HDLC SERPL-MCNC: 43 MG/DL (ref 40–59)
HGB BLD-MCNC: 13.8 G/DL (ref 12–16)
IMM GRANULOCYTES # BLD AUTO: 0.01 X10(3) UL (ref 0–1)
IMM GRANULOCYTES NFR BLD: 0.2 %
LDLC SERPL CALC-MCNC: 141 MG/DL (ref ?–100)
LYMPHOCYTES # BLD AUTO: 2.41 X10(3) UL (ref 1–4)
LYMPHOCYTES NFR BLD AUTO: 47.9 %
M PROTEIN MFR SERPL ELPH: 7.2 G/DL (ref 6.4–8.2)
MCH RBC QN AUTO: 29.3 PG (ref 26–34)
MCHC RBC AUTO-ENTMCNC: 31.7 G/DL (ref 31–37)
MCV RBC AUTO: 92.4 FL (ref 80–100)
MONOCYTES # BLD AUTO: 0.5 X10(3) UL (ref 0.1–1)
MONOCYTES NFR BLD AUTO: 9.9 %
NEUTROPHILS # BLD AUTO: 1.98 X10 (3) UL (ref 1.5–7.7)
NEUTROPHILS # BLD AUTO: 1.98 X10(3) UL (ref 1.5–7.7)
NEUTROPHILS NFR BLD AUTO: 39.4 %
NONHDLC SERPL-MCNC: 170 MG/DL (ref ?–130)
OSMOLALITY SERPL CALC.SUM OF ELEC: 286 MOSM/KG (ref 275–295)
PLATELET # BLD AUTO: 246 10(3)UL (ref 150–450)
POTASSIUM SERPL-SCNC: 4.4 MMOL/L (ref 3.5–5.1)
PREGNANCY TEST, URINE: NEGATIVE
RBC # BLD AUTO: 4.71 X10(6)UL (ref 3.8–5.3)
SODIUM SERPL-SCNC: 139 MMOL/L (ref 136–145)
T4 FREE SERPL-MCNC: 0.8 NG/DL (ref 0.8–1.7)
TRIGL SERPL-MCNC: 143 MG/DL (ref 30–149)
TSI SER-ACNC: 1.26 MIU/ML (ref 0.36–3.74)
VLDLC SERPL CALC-MCNC: 29 MG/DL (ref 0–30)
WBC # BLD AUTO: 5 X10(3) UL (ref 4–11)

## 2019-09-03 PROCEDURE — 80053 COMPREHEN METABOLIC PANEL: CPT

## 2019-09-03 PROCEDURE — 84439 ASSAY OF FREE THYROXINE: CPT

## 2019-09-03 PROCEDURE — 99213 OFFICE O/P EST LOW 20 MIN: CPT | Performed by: PHYSICIAN ASSISTANT

## 2019-09-03 PROCEDURE — 80061 LIPID PANEL: CPT

## 2019-09-03 PROCEDURE — 36415 COLL VENOUS BLD VENIPUNCTURE: CPT

## 2019-09-03 PROCEDURE — 85025 COMPLETE CBC W/AUTO DIFF WBC: CPT

## 2019-09-03 PROCEDURE — 99395 PREV VISIT EST AGE 18-39: CPT | Performed by: PHYSICIAN ASSISTANT

## 2019-09-03 PROCEDURE — 81025 URINE PREGNANCY TEST: CPT | Performed by: PHYSICIAN ASSISTANT

## 2019-09-03 PROCEDURE — 84443 ASSAY THYROID STIM HORMONE: CPT

## 2019-09-03 RX ORDER — NORGESTIMATE AND ETHINYL ESTRADIOL 7DAYSX3 LO
1 KIT ORAL DAILY
Qty: 3 PACKAGE | Refills: 3 | Status: SHIPPED | OUTPATIENT
Start: 2019-09-03 | End: 2020-06-11

## 2019-09-03 NOTE — PROGRESS NOTES
REASON FOR VISIT:    Laurel Donahue is a 27year old female who presents for an [address].     Pt is here for physical    She follows with gyne for cervical cancer screening, history of abnormal pap, she has recently had LEEP procedure    Pt FOB, OCCULTSTOOL   Obesity Screening Screen all adults annually Body mass index is 27.62 kg/m².      Preventive Services for Which Recommendations Vary with Risk Recommendation Internal Lab or Procedure   Cholesterol Screening Recommended screening varies w Maternal Grandfather       SOCIAL HISTORY:   Social History    Tobacco Use      Smoking status: Never Smoker      Smokeless tobacco: Never Used    Alcohol use: Yes      Comment: 1 q 2 wks on weekends, 2-3 drinks, never a problems    Drug use: No    Occ: all orders for this visit:    Wellness examination  PHQ-2 score 0  CAGE score 0  UTD on cervical cancer screening, follows with Dr. Claudean Platt and recommended on HPV vaccine    Laboratory exam ordered as part of routine general medical examination (Shingles) 60 and older: one dose   Varicella 2 doses if not immune   MMR 1-2 doses if born after 1954 and not immune

## 2019-09-03 NOTE — PATIENT INSTRUCTIONS
Thank you for choosing Danii Alberts PA-C at [place] [address]  To Do: Shahana Balbir  1. Patient to get lab work done  2. Begin new birth control  3.   Follow-up in 1 year, sooner if problems    • Please signup for MY CHART, which is electronic a Writer spoke with orthopedics they do not due adult cases of scoliosis would recommend. Either neuro or [facility].       approved your testing, please call Central Scheduling at [phone]  Please allow our office 5 business days to contact you regarding any testing results.     Refill policies:   Allow 3 business days for refills; controlled substances may take longer and

## 2019-09-09 RX ORDER — NORETHINDRONE ACETATE AND ETHINYL ESTRADIOL 1; 20 MG/1; UG/1
TABLET ORAL
Qty: 84 TABLET | Refills: 1 | OUTPATIENT
Start: 2019-09-09

## 2019-09-09 NOTE — TELEPHONE ENCOUNTER
Requested Prescriptions     Pending Prescriptions Disp Refills   • JUNEL 1/20 1-20 MG-MCG Oral Tab [Pharmacy Med Name: Otilia Frausto 1 MG-20 MCG TABLET] 84 tablet 1     Sig: TAKE 1 TABLET BY MOUTH EVERY DAY     LOV: 9/3/2019  RTC: 1 YEAR  Last Relevant Labs: 3/19/

## 2019-09-26 ENCOUNTER — HOSPITAL (OUTPATIENT)
Dept: OTHER | Age: 24
End: 2019-09-26

## 2020-05-19 ENCOUNTER — PATIENT MESSAGE (OUTPATIENT)
Dept: FAMILY MEDICINE CLINIC | Facility: CLINIC | Age: 25
End: 2020-05-19

## 2020-05-19 ENCOUNTER — TELEPHONE (OUTPATIENT)
Dept: OBGYN CLINIC | Facility: CLINIC | Age: 25
End: 2020-05-19

## 2020-05-19 NOTE — TELEPHONE ENCOUNTER
Last OV: 6/28/19 post op with Dr. Chris Burgos. S/p colp and LEEP on 6/12/19. Recommended RTC for repeat pap and annual in 6 mos    Contacted patient. Assisted with scheduling annual and repeat pap. Also provided info on [employer].

## 2020-05-20 NOTE — TELEPHONE ENCOUNTER
See [employer] message below:    Do you want her to set up a Telephone visit to discuss weight loss options?

## 2020-05-20 NOTE — TELEPHONE ENCOUNTER
From: Laurel Donahue  To:  Coni Davenport PA-C  Sent: 5/19/2020 5:45 PM CDT  Subject: Non-Urgent Medical Question    Hi Dr. Irma Grewal,    I'm messaging you because I was also thinking about scheduling an appointment to talk with you about some concerns

## 2020-05-26 ENCOUNTER — VIRTUAL PHONE E/M (OUTPATIENT)
Dept: FAMILY MEDICINE CLINIC | Facility: CLINIC | Age: 25
End: 2020-05-26
Payer: COMMERCIAL

## 2020-05-26 DIAGNOSIS — R63.5 WEIGHT GAIN: Primary | ICD-10-CM

## 2020-05-26 DIAGNOSIS — Z11.3 SCREEN FOR STD (SEXUALLY TRANSMITTED DISEASE): ICD-10-CM

## 2020-05-26 PROCEDURE — 99441 PHONE E/M BY PHYS 5-10 MIN: CPT | Performed by: FAMILY MEDICINE

## 2020-05-26 NOTE — PROGRESS NOTES
Virtual Telephone Check-In    Haylee Kearney erbally consents to a Virtual/Telephone Check-In visit on  05/26/20. Patient understands and accepts financial responsibility for any deductible, co-insurance and/or co-pays associated with this service. 2-SPECIFIC AB, IGG; Future           Return in about 1 week (around 6/2/2020) for review labs, weight gain. Dong Denis MD      Please note that the following visit was completed using two-way, real-time interactive audio communication.  This h

## 2020-05-26 NOTE — TELEPHONE ENCOUNTER
2nd outreach    Future Appointments   Date Time Provider Tony Pierce   5/26/2020  3:40 PM Sarah Hillman MD [facility] [address] [facility] [address]   6/11/2020  2:30 PM Bob Hurtado MD [facility] O [facility] [place]     Patient verbally consents to a virtual/telephon

## 2020-05-27 ENCOUNTER — LAB ENCOUNTER (OUTPATIENT)
Dept: LAB | Age: 25
End: 2020-05-27
Attending: FAMILY MEDICINE
Payer: COMMERCIAL

## 2020-05-27 DIAGNOSIS — Z11.3 SCREEN FOR STD (SEXUALLY TRANSMITTED DISEASE): ICD-10-CM

## 2020-05-27 DIAGNOSIS — R63.5 WEIGHT GAIN: ICD-10-CM

## 2020-05-27 PROCEDURE — 83880 ASSAY OF NATRIURETIC PEPTIDE: CPT

## 2020-05-27 PROCEDURE — 86706 HEP B SURFACE ANTIBODY: CPT

## 2020-05-27 PROCEDURE — 87591 N.GONORRHOEAE DNA AMP PROB: CPT

## 2020-05-27 PROCEDURE — 86803 HEPATITIS C AB TEST: CPT

## 2020-05-27 PROCEDURE — 87491 CHLMYD TRACH DNA AMP PROBE: CPT

## 2020-05-27 PROCEDURE — 86696 HERPES SIMPLEX TYPE 2 TEST: CPT

## 2020-05-27 PROCEDURE — 81003 URINALYSIS AUTO W/O SCOPE: CPT

## 2020-05-27 PROCEDURE — 86780 TREPONEMA PALLIDUM: CPT

## 2020-05-27 PROCEDURE — 87389 HIV-1 AG W/HIV-1&-2 AB AG IA: CPT

## 2020-05-27 PROCEDURE — 86695 HERPES SIMPLEX TYPE 1 TEST: CPT

## 2020-05-27 PROCEDURE — 84439 ASSAY OF FREE THYROXINE: CPT

## 2020-05-27 PROCEDURE — 87340 HEPATITIS B SURFACE AG IA: CPT

## 2020-05-27 PROCEDURE — 36415 COLL VENOUS BLD VENIPUNCTURE: CPT

## 2020-05-27 PROCEDURE — 84443 ASSAY THYROID STIM HORMONE: CPT

## 2020-06-11 ENCOUNTER — OFFICE VISIT (OUTPATIENT)
Dept: OBGYN CLINIC | Facility: CLINIC | Age: 25
End: 2020-06-11
Payer: COMMERCIAL

## 2020-06-11 VITALS
DIASTOLIC BLOOD PRESSURE: 68 MMHG | WEIGHT: 147 LBS | HEIGHT: 60 IN | BODY MASS INDEX: 28.86 KG/M2 | TEMPERATURE: 97 F | SYSTOLIC BLOOD PRESSURE: 102 MMHG

## 2020-06-11 DIAGNOSIS — Z01.419 WELL WOMAN EXAM WITH ROUTINE GYNECOLOGICAL EXAM: Primary | ICD-10-CM

## 2020-06-11 DIAGNOSIS — R10.30 LOWER ABDOMINAL PAIN: ICD-10-CM

## 2020-06-11 DIAGNOSIS — N89.8 VAGINAL DISCHARGE: ICD-10-CM

## 2020-06-11 DIAGNOSIS — Z12.4 ENCOUNTER FOR SCREENING FOR CERVICAL CANCER: ICD-10-CM

## 2020-06-11 DIAGNOSIS — Z87.410 HISTORY OF CERVICAL DYSPLASIA: ICD-10-CM

## 2020-06-11 PROCEDURE — 87660 TRICHOMONAS VAGIN DIR PROBE: CPT | Performed by: OBSTETRICS & GYNECOLOGY

## 2020-06-11 PROCEDURE — 88175 CYTOPATH C/V AUTO FLUID REDO: CPT | Performed by: OBSTETRICS & GYNECOLOGY

## 2020-06-11 PROCEDURE — 87491 CHLMYD TRACH DNA AMP PROBE: CPT | Performed by: OBSTETRICS & GYNECOLOGY

## 2020-06-11 PROCEDURE — 87591 N.GONORRHOEAE DNA AMP PROB: CPT | Performed by: OBSTETRICS & GYNECOLOGY

## 2020-06-11 PROCEDURE — 99395 PREV VISIT EST AGE 18-39: CPT | Performed by: OBSTETRICS & GYNECOLOGY

## 2020-06-11 PROCEDURE — 87510 GARDNER VAG DNA DIR PROBE: CPT | Performed by: OBSTETRICS & GYNECOLOGY

## 2020-06-11 PROCEDURE — 81002 URINALYSIS NONAUTO W/O SCOPE: CPT | Performed by: OBSTETRICS & GYNECOLOGY

## 2020-06-11 PROCEDURE — 87480 CANDIDA DNA DIR PROBE: CPT | Performed by: OBSTETRICS & GYNECOLOGY

## 2020-06-11 NOTE — PROGRESS NOTES
[facility]  Obstetrics and Gynecology  History & Physical    CC: Patient presents for a well woman exam     Subjective:     HPI: Eliana Gordon is a 23year old  female here for a well women exam. Patient reports doing well.  She had misse 11/01/2018 11/02/2018      HEP A                 06/28/2011 06/05/2012      HEP A,Ped/Adol,(2 Dose)                          06/05/2012      HEP B                 08/08/1997  10/20/1997  03/03/1998      HIB                   08/08/1997  10/02/1997  12/05 on phone: Not on file        Gets together: Not on file        Attends Jewish service: Not on file        Active member of club or organization: Not on file        Attends meetings of clubs or organizations: Not on file        Relationship status: Not o 28.71 kg/m². General: AAO.NAD.   CVS exam: normal peripheral perfusion  Chest: non-labored breathing, no tachypnea   Breast: symmetric, no dominant or suspicious mass, no skin or nipple changes and no axillary adenopathy. Bilateral breast implants.    Ab of exercise and healthy eating  - self breast exam instructions provided   - negative GC/Chl 05/2020  Lower abdominal cramping  - a/w vaginal discharge   - physical exam wnl   - CG/Chl sent, vaginitis panel sent   - advised pelvic US, will follow up on res

## 2020-06-12 ENCOUNTER — TELEPHONE (OUTPATIENT)
Dept: OBGYN CLINIC | Facility: CLINIC | Age: 25
End: 2020-06-12

## 2020-06-12 DIAGNOSIS — N76.0 BV (BACTERIAL VAGINOSIS): ICD-10-CM

## 2020-06-12 DIAGNOSIS — Z11.3 SCREENING EXAMINATION FOR STD (SEXUALLY TRANSMITTED DISEASE): ICD-10-CM

## 2020-06-12 DIAGNOSIS — A74.9 CHLAMYDIA INFECTION: Primary | ICD-10-CM

## 2020-06-12 DIAGNOSIS — B96.89 BV (BACTERIAL VAGINOSIS): ICD-10-CM

## 2020-06-12 PROBLEM — Z87.410 HISTORY OF CERVICAL DYSPLASIA: Status: ACTIVE | Noted: 2020-06-12

## 2020-06-12 RX ORDER — METRONIDAZOLE 7.5 MG/G
1 GEL VAGINAL NIGHTLY
Qty: 1 TUBE | Refills: 0 | Status: SHIPPED | OUTPATIENT
Start: 2020-06-12 | End: 2020-09-30

## 2020-06-12 RX ORDER — AZITHROMYCIN 500 MG/1
1000 TABLET, FILM COATED ORAL DAILY
Qty: 2 TABLET | Refills: 0 | Status: SHIPPED | OUTPATIENT
Start: 2020-06-12 | End: 2020-09-30

## 2020-06-12 NOTE — PROGRESS NOTES
Left detailed message for patient. Advised that we want to treat but did not have preference of oral vs. Vaginal.  Advised to call back Monday.

## 2020-06-12 NOTE — PROGRESS NOTES
Please inform of positive BV  Advised treatment    Offer metrogel vaginal cream per protocol or flagyl PO per protocol

## 2020-06-12 NOTE — TELEPHONE ENCOUNTER
Received page from lab. Positive chlamydia infection. Contacted patient. D/w patient lab results including positive chlamydia and positive BV. D/w patient prognosis, diagnosis and treatment for chlamydia.  D/w patient that chlamydia infection is sexually

## 2020-06-15 NOTE — TELEPHONE ENCOUNTER
Please confirm she was able to tolerate treatment for chlamydia infection. Needs follow up appointment in 3 months. Left message on answering machine to call back.

## 2020-06-15 NOTE — TELEPHONE ENCOUNTER
Patient returned call. Stated that she tolerated the medicine fine, no further questions. Made follow up appt in September.

## 2020-06-16 ENCOUNTER — LAB ENCOUNTER (OUTPATIENT)
Dept: LAB | Age: 25
End: 2020-06-16
Attending: OBSTETRICS & GYNECOLOGY
Payer: COMMERCIAL

## 2020-06-16 DIAGNOSIS — Z11.3 SCREENING EXAMINATION FOR STD (SEXUALLY TRANSMITTED DISEASE): ICD-10-CM

## 2020-06-16 PROCEDURE — 80074 ACUTE HEPATITIS PANEL: CPT

## 2020-06-16 PROCEDURE — 36415 COLL VENOUS BLD VENIPUNCTURE: CPT

## 2020-06-16 PROCEDURE — 87389 HIV-1 AG W/HIV-1&-2 AB AG IA: CPT

## 2020-06-16 PROCEDURE — 86780 TREPONEMA PALLIDUM: CPT

## 2020-06-17 NOTE — PROGRESS NOTES
colposcopy and LEEP 2/2 AMARJIT III on 06/12/19 - AMARJIT I posterior with negative margins, AMARJIT II-III anterior with negative margins and ECC negative. Advised to return in 6 months but not completed.    hx of Colposcopy AMARJIT III and ECC negative 03/19/2019  hx of

## 2020-06-18 ENCOUNTER — ULTRASOUND ENCOUNTER (OUTPATIENT)
Dept: OBGYN CLINIC | Facility: CLINIC | Age: 25
End: 2020-06-18
Payer: COMMERCIAL

## 2020-06-18 DIAGNOSIS — R10.2 PELVIC PAIN: Primary | ICD-10-CM

## 2020-06-18 PROCEDURE — 76830 TRANSVAGINAL US NON-OB: CPT | Performed by: OBSTETRICS & GYNECOLOGY

## 2020-06-18 PROCEDURE — 76856 US EXAM PELVIC COMPLETE: CPT | Performed by: OBSTETRICS & GYNECOLOGY

## 2020-06-22 ENCOUNTER — TELEPHONE (OUTPATIENT)
Dept: OBGYN CLINIC | Facility: CLINIC | Age: 25
End: 2020-06-22

## 2020-09-21 PROBLEM — Z86.19 HISTORY OF CHLAMYDIA INFECTION: Status: ACTIVE | Noted: 2020-09-21

## 2020-09-30 ENCOUNTER — OFFICE VISIT (OUTPATIENT)
Dept: OBGYN CLINIC | Facility: CLINIC | Age: 25
End: 2020-09-30
Payer: COMMERCIAL

## 2020-09-30 VITALS
WEIGHT: 148.38 LBS | DIASTOLIC BLOOD PRESSURE: 60 MMHG | SYSTOLIC BLOOD PRESSURE: 108 MMHG | BODY MASS INDEX: 29 KG/M2 | TEMPERATURE: 98 F

## 2020-09-30 DIAGNOSIS — Z23 NEED FOR VACCINATION: ICD-10-CM

## 2020-09-30 DIAGNOSIS — A74.9 CHLAMYDIA: Primary | ICD-10-CM

## 2020-09-30 PROCEDURE — 87491 CHLMYD TRACH DNA AMP PROBE: CPT | Performed by: NURSE PRACTITIONER

## 2020-09-30 PROCEDURE — 87591 N.GONORRHOEAE DNA AMP PROB: CPT | Performed by: NURSE PRACTITIONER

## 2020-09-30 PROCEDURE — 90686 IIV4 VACC NO PRSV 0.5 ML IM: CPT | Performed by: NURSE PRACTITIONER

## 2020-09-30 PROCEDURE — 90471 IMMUNIZATION ADMIN: CPT | Performed by: NURSE PRACTITIONER

## 2020-09-30 PROCEDURE — 3074F SYST BP LT 130 MM HG: CPT | Performed by: NURSE PRACTITIONER

## 2020-09-30 PROCEDURE — 99213 OFFICE O/P EST LOW 20 MIN: CPT | Performed by: NURSE PRACTITIONER

## 2020-09-30 PROCEDURE — 3078F DIAST BP <80 MM HG: CPT | Performed by: NURSE PRACTITIONER

## 2020-09-30 NOTE — PROGRESS NOTES
Gyne note     S: patient is a 23year old yo  here for a follow up after testing positive and treating Chlamydia. She has no current concerns or questions. Review of Systems:  General: denies fevers, chills, fatigue and malaise.      O:/60

## 2020-11-19 ENCOUNTER — PATIENT MESSAGE (OUTPATIENT)
Dept: FAMILY MEDICINE CLINIC | Facility: CLINIC | Age: 25
End: 2020-11-19

## 2020-11-20 NOTE — TELEPHONE ENCOUNTER
From: Shahana Mcgregor  To: Danii Alberts PA-C  Sent: 11/19/2020 7:29 PM CST  Subject: Other    Hello Dr. Huey Fuller,    I currently am sick and not sure if it's a cold or if it could be Covid.  The symptoms I'm having is a runny nose, sore throat, and co

## 2020-11-23 ENCOUNTER — OFFICE VISIT (OUTPATIENT)
Dept: FAMILY MEDICINE CLINIC | Facility: CLINIC | Age: 25
End: 2020-11-23
Payer: COMMERCIAL

## 2020-11-23 ENCOUNTER — APPOINTMENT (OUTPATIENT)
Dept: LAB | Age: 25
End: 2020-11-23
Attending: FAMILY MEDICINE
Payer: COMMERCIAL

## 2020-11-23 DIAGNOSIS — J02.9 SORE THROAT: ICD-10-CM

## 2020-11-23 DIAGNOSIS — R05.9 COUGH: ICD-10-CM

## 2020-11-23 DIAGNOSIS — R09.89 RUNNY NOSE: ICD-10-CM

## 2020-11-23 DIAGNOSIS — J02.9 SORE THROAT: Primary | ICD-10-CM

## 2020-11-23 PROCEDURE — 99214 OFFICE O/P EST MOD 30 MIN: CPT | Performed by: FAMILY MEDICINE

## 2020-11-23 RX ORDER — FLUTICASONE PROPIONATE 50 MCG
2 SPRAY, SUSPENSION (ML) NASAL DAILY
Qty: 1 BOTTLE | Refills: 0 | Status: SHIPPED | OUTPATIENT
Start: 2020-11-23 | End: 2021-04-14

## 2020-11-30 ENCOUNTER — TELEPHONE (OUTPATIENT)
Dept: OBGYN CLINIC | Facility: CLINIC | Age: 25
End: 2020-11-30

## 2020-11-30 NOTE — TELEPHONE ENCOUNTER
Patient reported sore throat, runny nose, and cough to PCP on 11/23/20. covid testing done and negative on 11/26/20  Per PCP notes, symptoms started on 11/18/20. Contacted patient. Continues to have some coughing. Symptoms did start on 11/18.      Moved

## 2020-12-14 ENCOUNTER — TELEPHONE (OUTPATIENT)
Dept: OBGYN CLINIC | Facility: CLINIC | Age: 25
End: 2020-12-14

## 2021-04-14 ENCOUNTER — OFFICE VISIT (OUTPATIENT)
Dept: FAMILY MEDICINE CLINIC | Facility: CLINIC | Age: 26
End: 2021-04-14
Payer: COMMERCIAL

## 2021-04-14 ENCOUNTER — LABORATORY ENCOUNTER (OUTPATIENT)
Dept: LAB | Age: 26
End: 2021-04-14
Attending: PHYSICIAN ASSISTANT
Payer: COMMERCIAL

## 2021-04-14 VITALS
WEIGHT: 148 LBS | OXYGEN SATURATION: 99 % | DIASTOLIC BLOOD PRESSURE: 70 MMHG | BODY MASS INDEX: 29.06 KG/M2 | SYSTOLIC BLOOD PRESSURE: 110 MMHG | HEIGHT: 60 IN | RESPIRATION RATE: 16 BRPM | HEART RATE: 74 BPM | TEMPERATURE: 98 F

## 2021-04-14 DIAGNOSIS — R63.5 WEIGHT GAIN: ICD-10-CM

## 2021-04-14 DIAGNOSIS — E66.3 OVERWEIGHT (BMI 25.0-29.9): ICD-10-CM

## 2021-04-14 DIAGNOSIS — Z00.00 WELLNESS EXAMINATION: Primary | ICD-10-CM

## 2021-04-14 DIAGNOSIS — R53.83 FATIGUE, UNSPECIFIED TYPE: ICD-10-CM

## 2021-04-14 DIAGNOSIS — D06.9 CIN III (CERVICAL INTRAEPITHELIAL NEOPLASIA GRADE III) WITH SEVERE DYSPLASIA: ICD-10-CM

## 2021-04-14 DIAGNOSIS — Z01.89 ROUTINE LAB DRAW: ICD-10-CM

## 2021-04-14 DIAGNOSIS — Z11.3 SCREENING FOR STDS (SEXUALLY TRANSMITTED DISEASES): ICD-10-CM

## 2021-04-14 PROCEDURE — 80061 LIPID PANEL: CPT

## 2021-04-14 PROCEDURE — 3008F BODY MASS INDEX DOCD: CPT | Performed by: PHYSICIAN ASSISTANT

## 2021-04-14 PROCEDURE — 80053 COMPREHEN METABOLIC PANEL: CPT

## 2021-04-14 PROCEDURE — 87591 N.GONORRHOEAE DNA AMP PROB: CPT

## 2021-04-14 PROCEDURE — 85025 COMPLETE CBC W/AUTO DIFF WBC: CPT

## 2021-04-14 PROCEDURE — 3078F DIAST BP <80 MM HG: CPT | Performed by: PHYSICIAN ASSISTANT

## 2021-04-14 PROCEDURE — 3074F SYST BP LT 130 MM HG: CPT | Performed by: PHYSICIAN ASSISTANT

## 2021-04-14 PROCEDURE — 90651 9VHPV VACCINE 2/3 DOSE IM: CPT | Performed by: PHYSICIAN ASSISTANT

## 2021-04-14 PROCEDURE — 84439 ASSAY OF FREE THYROXINE: CPT

## 2021-04-14 PROCEDURE — 90471 IMMUNIZATION ADMIN: CPT | Performed by: PHYSICIAN ASSISTANT

## 2021-04-14 PROCEDURE — 86800 THYROGLOBULIN ANTIBODY: CPT

## 2021-04-14 PROCEDURE — 87491 CHLMYD TRACH DNA AMP PROBE: CPT

## 2021-04-14 PROCEDURE — 84443 ASSAY THYROID STIM HORMONE: CPT

## 2021-04-14 PROCEDURE — 86376 MICROSOMAL ANTIBODY EACH: CPT

## 2021-04-14 PROCEDURE — 99395 PREV VISIT EST AGE 18-39: CPT | Performed by: PHYSICIAN ASSISTANT

## 2021-04-14 PROCEDURE — 36415 COLL VENOUS BLD VENIPUNCTURE: CPT

## 2021-04-14 RX ORDER — METRONIDAZOLE 500 MG/1
500 TABLET ORAL 2 TIMES DAILY
Qty: 14 TABLET | Refills: 0 | Status: SHIPPED | OUTPATIENT
Start: 2021-04-14 | End: 2021-08-18

## 2021-04-14 NOTE — PATIENT INSTRUCTIONS
Thank you for choosing Fernanda Noel PA-C at [place] [id number]  To Do: Dipika Mccain  1. Begin medication as prescribed  2. Get lab work done  3. Contact gyne for follow-up pap smear  4.  Follow-up in 1 year, sooner if problems    • Please signup f informing you that the insurance company approved your testing, please call Central Scheduling at [phone]  Please allow our office 5 business days to contact you regarding any testing results.     Refill policies:   Allow 3 business days for refills; c

## 2021-04-14 NOTE — PROGRESS NOTES
REASON FOR VISIT:    Mando Bae is a 23year old female who presents for an [address].     Pt would like her thyroid checked, she has been struggling with weight and inability to lose weight  She quit drinking soda  Exercises 4 days a we No  Scoring  Total Score: 0     Depression Screening (PHQ-2/PHQ-9):  Over the LAST 2 WEEKS   Little interest or pleasure in doing things (over the last two weeks)?: Not at all  Feeling down, depressed, or hopeless (over the last two weeks)?: Not at all  Manny Johnson MG Oral Tab Take 1 tablet (500 mg total) by mouth 2 (two) times a day.  14 tablet 0      MEDICAL INFORMATION:   Past Medical History:   Diagnosis Date   • Anemia    • Anxiety    • Cervical intraepithelial neoplasia (AMARJIT) 06/12/2019    grade 2-3, Dr. Kandace Grider menstrual period was 03/12/2021. GENERAL: well developed, well nourished, in no apparent distress   SKIN: no rashes, no suspicious lesions  HEENT: atraumatic, normocephalic, ears and throat are clear  EYES:PERRLA, EOMI, conjunctiva are clear.     NECK: s produce with every meal    AMARJIT III (cervical intraepithelial neoplasia grade III) with severe dysplasia  Continue to follow-up with gyne  Encouraged pt to contact and schedule an appointment as she is due for cervical cancer screening    Screening for STDs

## 2021-08-18 ENCOUNTER — OFFICE VISIT (OUTPATIENT)
Dept: OBGYN CLINIC | Facility: CLINIC | Age: 26
End: 2021-08-18
Payer: COMMERCIAL

## 2021-08-18 VITALS
HEIGHT: 60 IN | WEIGHT: 144.19 LBS | DIASTOLIC BLOOD PRESSURE: 60 MMHG | BODY MASS INDEX: 28.31 KG/M2 | SYSTOLIC BLOOD PRESSURE: 98 MMHG | HEART RATE: 64 BPM

## 2021-08-18 DIAGNOSIS — N93.9 VAGINAL SPOTTING: ICD-10-CM

## 2021-08-18 DIAGNOSIS — Z11.3 SCREENING EXAMINATION FOR STD (SEXUALLY TRANSMITTED DISEASE): ICD-10-CM

## 2021-08-18 DIAGNOSIS — Z12.4 ENCOUNTER FOR SCREENING FOR CERVICAL CANCER: ICD-10-CM

## 2021-08-18 DIAGNOSIS — Z01.419 WELL WOMAN EXAM WITH ROUTINE GYNECOLOGICAL EXAM: Primary | ICD-10-CM

## 2021-08-18 PROCEDURE — 88175 CYTOPATH C/V AUTO FLUID REDO: CPT | Performed by: OBSTETRICS & GYNECOLOGY

## 2021-08-18 PROCEDURE — 87491 CHLMYD TRACH DNA AMP PROBE: CPT | Performed by: OBSTETRICS & GYNECOLOGY

## 2021-08-18 PROCEDURE — 87591 N.GONORRHOEAE DNA AMP PROB: CPT | Performed by: OBSTETRICS & GYNECOLOGY

## 2021-08-18 PROCEDURE — 3078F DIAST BP <80 MM HG: CPT | Performed by: OBSTETRICS & GYNECOLOGY

## 2021-08-18 PROCEDURE — 87660 TRICHOMONAS VAGIN DIR PROBE: CPT | Performed by: OBSTETRICS & GYNECOLOGY

## 2021-08-18 PROCEDURE — 87624 HPV HI-RISK TYP POOLED RSLT: CPT | Performed by: OBSTETRICS & GYNECOLOGY

## 2021-08-18 PROCEDURE — 87510 GARDNER VAG DNA DIR PROBE: CPT | Performed by: OBSTETRICS & GYNECOLOGY

## 2021-08-18 PROCEDURE — 3074F SYST BP LT 130 MM HG: CPT | Performed by: OBSTETRICS & GYNECOLOGY

## 2021-08-18 PROCEDURE — 99395 PREV VISIT EST AGE 18-39: CPT | Performed by: OBSTETRICS & GYNECOLOGY

## 2021-08-18 PROCEDURE — 3008F BODY MASS INDEX DOCD: CPT | Performed by: OBSTETRICS & GYNECOLOGY

## 2021-08-18 PROCEDURE — 87480 CANDIDA DNA DIR PROBE: CPT | Performed by: OBSTETRICS & GYNECOLOGY

## 2021-08-18 NOTE — PROGRESS NOTES
[facility]  Obstetrics and Gynecology  History & Physical    CC: Patient presents for a well woman exam     Subjective:     HPI: Elias Turner is a 27year old [place] female here for a well women exam. Patient reports doing well.   She reports 06/28/2011 06/05/2012      HEP A,Ped/Adol,(2 Dose)                          06/05/2012      HEP B                 08/08/1997  10/20/1997  03/03/1998      HIB                   08/08/1997  10/02/1997  12/05/1997                            09/04/1998      H History Narrative      Not on file    Social Determinants of Health  Financial Resource Strain:       Difficulty of Paying Living Expenses:   Food Insecurity:       Worried About [address] [employer] in the Last Year:       Ran Out of Food in the Last Year: Breast: symmetric, no dominant or suspicious mass, no skin or nipple changes and no axillary adenopathy  Abdominal exam: soft, nontender, nondistended  Pelvic exam:   VULVA: normal appearing vulva with no masses, tenderness or lesions  URETHRA: normal ap patient. She notes understanding and agrees with the plan of care. All questions were answered to the best of my ability at this time.       RTC in 1 year for a well woman exam or sooner if needed     Ousmane Ellsworth MD   [facility] - OBGYN

## 2021-08-19 LAB
C TRACH DNA SPEC QL NAA+PROBE: NEGATIVE
N GONORRHOEA DNA SPEC QL NAA+PROBE: NEGATIVE

## 2021-08-20 DIAGNOSIS — B37.9 YEAST INFECTION: ICD-10-CM

## 2021-08-20 DIAGNOSIS — B96.89 BV (BACTERIAL VAGINOSIS): Primary | ICD-10-CM

## 2021-08-20 DIAGNOSIS — N76.0 BV (BACTERIAL VAGINOSIS): Primary | ICD-10-CM

## 2021-08-20 RX ORDER — FLUCONAZOLE 150 MG/1
150 TABLET ORAL ONCE
Qty: 2 TABLET | Refills: 0 | Status: SHIPPED | OUTPATIENT
Start: 2021-08-20 | End: 2021-08-20

## 2021-08-20 RX ORDER — METRONIDAZOLE 500 MG/1
500 TABLET ORAL 2 TIMES DAILY
Qty: 14 TABLET | Refills: 0 | Status: SHIPPED | OUTPATIENT
Start: 2021-08-20

## 2021-08-20 NOTE — PROGRESS NOTES
Positive bacterial vaginosis. Positive yeast infection. New prescription sent. Triondhart message sent.

## 2021-08-25 LAB
HPV I/H RISK 1 DNA SPEC QL NAA+PROBE: NEGATIVE
LAST PAP RESULT: NORMAL

## 2021-09-20 DIAGNOSIS — B96.89 BV (BACTERIAL VAGINOSIS): ICD-10-CM

## 2021-09-20 DIAGNOSIS — N76.0 BV (BACTERIAL VAGINOSIS): ICD-10-CM

## 2021-09-20 RX ORDER — METRONIDAZOLE 500 MG/1
500 TABLET ORAL 2 TIMES DAILY
Qty: 14 TABLET | Refills: 0 | OUTPATIENT
Start: 2021-09-20

## 2021-10-06 DIAGNOSIS — N76.0 BV (BACTERIAL VAGINOSIS): ICD-10-CM

## 2021-10-06 DIAGNOSIS — B96.89 BV (BACTERIAL VAGINOSIS): ICD-10-CM

## 2021-10-06 RX ORDER — METRONIDAZOLE 500 MG/1
500 TABLET ORAL 2 TIMES DAILY
Qty: 14 TABLET | Refills: 0 | OUTPATIENT
Start: 2021-10-06

## 2022-01-06 ENCOUNTER — TELEMEDICINE (OUTPATIENT)
Dept: FAMILY MEDICINE CLINIC | Facility: CLINIC | Age: 27
End: 2022-01-06

## 2022-01-06 DIAGNOSIS — R41.840 ATTENTION DISTURBANCE: Primary | ICD-10-CM

## 2022-01-06 PROCEDURE — 99213 OFFICE O/P EST LOW 20 MIN: CPT | Performed by: FAMILY MEDICINE

## 2022-01-06 NOTE — PROGRESS NOTES
Video Visit- Alissa Escobar  This is a telemedicine visit with live, interactive video and audio.      Jada Villatoro understands and accepts financial responsibility for any deductible, co-insurance and/or co-pays so would like her to see psychiatry for official dx. Return for after seeing psychiatry. Jarad Valiente MD      Please note that the following visit was completed using two-way, real-time interactive audio and visual communication.  This ha

## 2022-03-16 ENCOUNTER — PATIENT MESSAGE (OUTPATIENT)
Dept: FAMILY MEDICINE CLINIC | Facility: CLINIC | Age: 27
End: 2022-03-16

## 2022-03-16 ENCOUNTER — OFFICE VISIT (OUTPATIENT)
Dept: FAMILY MEDICINE CLINIC | Facility: CLINIC | Age: 27
End: 2022-03-16
Payer: COMMERCIAL

## 2022-03-16 VITALS
RESPIRATION RATE: 16 BRPM | OXYGEN SATURATION: 98 % | DIASTOLIC BLOOD PRESSURE: 60 MMHG | BODY MASS INDEX: 29.84 KG/M2 | HEIGHT: 60 IN | SYSTOLIC BLOOD PRESSURE: 100 MMHG | WEIGHT: 152 LBS | HEART RATE: 65 BPM | TEMPERATURE: 98 F

## 2022-03-16 DIAGNOSIS — Z11.3 SCREEN FOR STD (SEXUALLY TRANSMITTED DISEASE): ICD-10-CM

## 2022-03-16 DIAGNOSIS — Z23 NEED FOR VACCINATION: ICD-10-CM

## 2022-03-16 DIAGNOSIS — Z78.9 NONIMMUNE TO HEPATITIS B VIRUS: ICD-10-CM
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
## 2022-03-16 PROCEDURE — 90746 HEPB VACCINE 3 DOSE ADULT IM: CPT | Performed by: FAMILY MEDICINE

## 2022-03-16 PROCEDURE — 99214 OFFICE O/P EST MOD 30 MIN: CPT | Performed by: FAMILY MEDICINE

## 2022-03-16 PROCEDURE — 3078F DIAST BP <80 MM HG: CPT | Performed by: FAMILY MEDICINE

## 2022-03-16 PROCEDURE — 3074F SYST BP LT 130 MM HG: CPT | Performed by: FAMILY MEDICINE

## 2022-03-16 PROCEDURE — 90471 IMMUNIZATION ADMIN: CPT | Performed by: FAMILY MEDICINE

## 2022-03-16 PROCEDURE — 3008F BODY MASS INDEX DOCD: CPT | Performed by: FAMILY MEDICINE

## 2022-03-16 RX ORDER — AMOXICILLIN AND CLAVULANATE POTASSIUM 500; 125 MG/1; MG/1
1 TABLET, FILM COATED ORAL EVERY 12 HOURS
COMMUNITY
Start: 2022-03-12 | End: 2022-03-25 | Stop reason: ALTCHOICE

## 2022-03-16 NOTE — TELEPHONE ENCOUNTER
From: Marcia Martinez  To: Vikram Reyes MD  Sent: 3/16/2022 12:16 PM CDT  Subject: Doctors note form     Good afternoon Dr Toshia Delong! Here is a copy of that Doctors note. How long do you recommend I take time off from the phone?

## 2022-03-17 ENCOUNTER — PATIENT MESSAGE (OUTPATIENT)
Dept: FAMILY MEDICINE CLINIC | Facility: CLINIC | Age: 27
End: 2022-03-17

## 2022-03-17 LAB
APPEARANCE: CLEAR
CHLAMYDIA TRACHOMATIS$RNA, TMA: NOT DETECTED
COLOR: YELLOW
GLUCOSE: NEGATIVE
KETONES: NEGATIVE
LEUKOCYTE ESTERASE: NEGATIVE
NEISSERIA GONORRHOEAE$RNA, TMA: NOT DETECTED
NITRITE: NEGATIVE
OCCULT BLOOD: NEGATIVE
PH: 5.5 (ref 5–8)
PROTEIN: NEGATIVE
SIGNAL TO CUT-OFF: 0.03
SPECIFIC GRAVITY: 1.01 (ref 1–1.03)

## 2022-03-17 NOTE — TELEPHONE ENCOUNTER
I spoke with patient over the phone and completed form. She has to sign and complete a few sections. She will come to office tomorrow (Fri 3/18) to complete form. Please make a copy of form for our records.  Thank you   Placed on the 's keyboard

## 2022-03-18 NOTE — TELEPHONE ENCOUNTER
What does that mean? Usually they just have to have enough urine. Is it she wiped off and wasn't supposed to?

## 2022-03-21 NOTE — TELEPHONE ENCOUNTER
Pt came into office on 3-19 to sign form. Copy in blue accordion next to  copier/faxer. Copy sent to scan.

## 2022-03-24 ENCOUNTER — TELEPHONE (OUTPATIENT)
Dept: FAMILY MEDICINE CLINIC | Facility: CLINIC | Age: 27
End: 2022-03-24

## 2022-03-25 ENCOUNTER — OFFICE VISIT (OUTPATIENT)
Dept: FAMILY MEDICINE CLINIC | Facility: CLINIC | Age: 27
End: 2022-03-25
Payer: COMMERCIAL

## 2022-03-25 VITALS
BODY MASS INDEX: 29.06 KG/M2 | HEIGHT: 60 IN | RESPIRATION RATE: 16 BRPM | SYSTOLIC BLOOD PRESSURE: 106 MMHG | HEART RATE: 72 BPM | WEIGHT: 148 LBS | TEMPERATURE: 98 F | DIASTOLIC BLOOD PRESSURE: 60 MMHG
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
## 2022-03-25 DIAGNOSIS — S01.85XD: Primary | ICD-10-CM

## 2022-03-25 DIAGNOSIS — L73.9 FOLLICULITIS: ICD-10-CM

## 2022-03-25 PROCEDURE — 99213 OFFICE O/P EST LOW 20 MIN: CPT | Performed by: FAMILY MEDICINE

## 2022-03-25 PROCEDURE — 3074F SYST BP LT 130 MM HG: CPT | Performed by: FAMILY MEDICINE

## 2022-03-25 PROCEDURE — 3008F BODY MASS INDEX DOCD: CPT | Performed by: FAMILY MEDICINE

## 2022-03-25 PROCEDURE — 3078F DIAST BP <80 MM HG: CPT | Performed by: FAMILY MEDICINE

## 2022-03-25 RX ORDER — CLINDAMYCIN PHOSPHATE 10 MG/G
1 GEL TOPICAL NIGHTLY
Qty: 60 G | Refills: 0 | Status: SHIPPED | OUTPATIENT
Start: 2022-03-25 | End: 2023-03-20

## 2022-05-04 DIAGNOSIS — L73.9 FOLLICULITIS: ICD-10-CM

## 2022-05-04 RX ORDER — CLINDAMYCIN PHOSPHATE 10 MG/G
1 GEL TOPICAL NIGHTLY
Qty: 60 G | Refills: 2 | Status: SHIPPED | OUTPATIENT
Start: 2022-05-04 | End: 2023-04-29

## 2022-07-08 ENCOUNTER — OFFICE VISIT (OUTPATIENT)
Dept: OBGYN CLINIC | Facility: CLINIC | Age: 27
End: 2022-07-08
Payer: COMMERCIAL

## 2022-07-08 VITALS
DIASTOLIC BLOOD PRESSURE: 68 MMHG | HEART RATE: 82 BPM | WEIGHT: 150.38 LBS | BODY MASS INDEX: 29.52 KG/M2 | HEIGHT: 60 IN | SYSTOLIC BLOOD PRESSURE: 104 MMHG

## 2022-07-08 DIAGNOSIS — Z23 NEED FOR VACCINATION: ICD-10-CM

## 2022-07-08 DIAGNOSIS — Z11.3 SCREENING EXAMINATION FOR STD (SEXUALLY TRANSMITTED DISEASE): ICD-10-CM

## 2022-07-08 DIAGNOSIS — Z01.419 WELL WOMAN EXAM WITH ROUTINE GYNECOLOGICAL EXAM: Primary | ICD-10-CM

## 2022-07-08 DIAGNOSIS — Z12.4 ENCOUNTER FOR SCREENING FOR CERVICAL CANCER: ICD-10-CM

## 2022-07-08 PROCEDURE — 3078F DIAST BP <80 MM HG: CPT | Performed by: OBSTETRICS & GYNECOLOGY

## 2022-07-08 PROCEDURE — 3008F BODY MASS INDEX DOCD: CPT | Performed by: OBSTETRICS & GYNECOLOGY

## 2022-07-08 PROCEDURE — 99395 PREV VISIT EST AGE 18-39: CPT | Performed by: OBSTETRICS & GYNECOLOGY

## 2022-07-08 PROCEDURE — 90471 IMMUNIZATION ADMIN: CPT | Performed by: OBSTETRICS & GYNECOLOGY

## 2022-07-08 PROCEDURE — 90651 9VHPV VACCINE 2/3 DOSE IM: CPT | Performed by: OBSTETRICS & GYNECOLOGY

## 2022-07-08 PROCEDURE — 3074F SYST BP LT 130 MM HG: CPT | Performed by: OBSTETRICS & GYNECOLOGY

## 2022-07-08 RX ORDER — FLUTICASONE PROPIONATE 50 MCG
1 SPRAY, SUSPENSION (ML) NASAL DAILY
COMMUNITY
Start: 2022-04-29

## 2022-07-12 LAB
CHLAMYDIA TRACHOMATIS$RNA, TMA: NOT DETECTED
HPV MRNA E6/E7: NOT DETECTED
NEISSERIA GONORRHOEAE$RNA, TMA: NOT DETECTED

## 2022-08-11 ENCOUNTER — TELEMEDICINE (OUTPATIENT)
Dept: FAMILY MEDICINE CLINIC | Facility: CLINIC | Age: 27
End: 2022-08-11

## 2022-08-11 DIAGNOSIS — Z32.01 POSITIVE PREGNANCY TEST: Primary | ICD-10-CM

## 2022-08-11 PROCEDURE — 99213 OFFICE O/P EST LOW 20 MIN: CPT | Performed by: FAMILY MEDICINE

## 2022-10-18 ENCOUNTER — HOSPITAL ENCOUNTER (OUTPATIENT)
Age: 27
Discharge: HOME | End: 2022-10-18
Payer: COMMERCIAL

## 2022-10-18 VITALS
DIASTOLIC BLOOD PRESSURE: 71 MMHG | WEIGHT: 140 LBS | SYSTOLIC BLOOD PRESSURE: 122 MMHG | OXYGEN SATURATION: 98 % | TEMPERATURE: 98 F | BODY MASS INDEX: 27.48 KG/M2 | RESPIRATION RATE: 18 BRPM | HEIGHT: 60 IN | HEART RATE: 82 BPM

## 2022-10-18 DIAGNOSIS — J03.90 TONSILLITIS: Primary | ICD-10-CM

## 2022-10-18 PROCEDURE — 99203 OFFICE O/P NEW LOW 30 MIN: CPT

## 2022-10-18 PROCEDURE — 99213 OFFICE O/P EST LOW 20 MIN: CPT

## 2022-10-18 RX ORDER — AMOXICILLIN 500 MG/1
500 TABLET, FILM COATED ORAL 2 TIMES DAILY
Qty: 20 TABLET | Refills: 0 | Status: SHIPPED | OUTPATIENT
Start: 2022-10-18 | End: 2022-10-28

## 2022-10-18 NOTE — ED INITIAL ASSESSMENT (HPI)
Sore throat starting yesterday. Roommate tested strep+ over the weekend. Had septorhinoplasty 1 week ago.

## 2022-12-03 ENCOUNTER — HOSPITAL ENCOUNTER (OUTPATIENT)
Age: 27
Discharge: HOME | End: 2022-12-03
Payer: COMMERCIAL

## 2022-12-03 VITALS
WEIGHT: 150 LBS | HEIGHT: 60 IN | RESPIRATION RATE: 20 BRPM | DIASTOLIC BLOOD PRESSURE: 57 MMHG | TEMPERATURE: 98 F | OXYGEN SATURATION: 98 % | HEART RATE: 82 BPM | SYSTOLIC BLOOD PRESSURE: 115 MMHG | BODY MASS INDEX: 29.45 KG/M2

## 2022-12-03 DIAGNOSIS — B34.9 VIRAL SYNDROME: Primary | ICD-10-CM

## 2022-12-03 LAB
POCT INFLUENZA A: NEGATIVE
POCT INFLUENZA B: NEGATIVE

## 2022-12-03 PROCEDURE — 87502 INFLUENZA DNA AMP PROBE: CPT | Performed by: NURSE PRACTITIONER

## 2022-12-03 PROCEDURE — 87637 SARSCOV2&INF A&B&RSV AMP PRB: CPT | Performed by: NURSE PRACTITIONER

## 2022-12-03 PROCEDURE — 99213 OFFICE O/P EST LOW 20 MIN: CPT

## 2022-12-03 NOTE — ED INITIAL ASSESSMENT (HPI)
Pt has a roommate with flu and yesterday pt began with cough congestion, and mild headache  No fever

## 2022-12-03 NOTE — DISCHARGE INSTRUCTIONS
Tylenol and Motrin as needed for any fever pain. Mucinex for congestion and cough. Increase oral fluids. Your viral panel swab will come back in the next 1 to 3 days.

## 2022-12-04 LAB
FLUAV + FLUBV RNA SPEC NAA+PROBE: NOT DETECTED
FLUAV + FLUBV RNA SPEC NAA+PROBE: NOT DETECTED
RSV RNA SPEC NAA+PROBE: NOT DETECTED
SARS-COV-2 RNA RESP QL NAA+PROBE: NOT DETECTED

## 2023-03-03 NOTE — PROGRESS NOTES
----- Message from BECKY Armas sent at 3/3/2023  8:19 AM CST -----  Trigs and Cholesterol are elevated- Recommend her starting a statin   Left message on answering machine to call back.

## 2023-11-25 ENCOUNTER — HOSPITAL ENCOUNTER (OUTPATIENT)
Age: 28
Discharge: HOME | End: 2023-11-25
Payer: COMMERCIAL

## 2023-11-25 VITALS
HEART RATE: 81 BPM | OXYGEN SATURATION: 97 % | DIASTOLIC BLOOD PRESSURE: 73 MMHG | TEMPERATURE: 98 F | HEIGHT: 61 IN | WEIGHT: 130 LBS | RESPIRATION RATE: 20 BRPM | SYSTOLIC BLOOD PRESSURE: 139 MMHG | BODY MASS INDEX: 24.55 KG/M2

## 2023-11-25 DIAGNOSIS — B34.9 VIRAL SYNDROME: Primary | ICD-10-CM

## 2023-11-25 RX ORDER — CETIRIZINE HYDROCHLORIDE 10 MG/1
10 TABLET ORAL DAILY
Qty: 30 TABLET | Refills: 0 | Status: SHIPPED | OUTPATIENT
Start: 2023-11-25 | End: 2023-12-25

## 2023-11-25 RX ORDER — PREDNISONE 20 MG/1
40 TABLET ORAL DAILY
Qty: 6 TABLET | Refills: 0 | Status: SHIPPED | OUTPATIENT
Start: 2023-11-25 | End: 2023-11-28

## 2023-11-25 NOTE — DISCHARGE INSTRUCTIONS
Please return to the ER/clinic if symptoms worsen. Follow-up with your PCP in 24-48 hours as needed. Push fluids. Sleep more upright. Recommend over-the-counter antihistamine daily i.e. Zyrtec or Claritin. Use Chloraseptic spray to help stop the cough trigger reflex. The Decadron to work in your system for the next several days. You may start the additional prednisone on day 2 or 3 if symptoms persist.  Chills persist or worsening i.e. increasing fevers or shortness of breath go directly to the emergency room. Otherwise follow-up with your PCP for further evaluation and treatment.

## 2024-05-28 ENCOUNTER — OFFICE VISIT (OUTPATIENT)
Dept: FAMILY MEDICINE CLINIC | Facility: CLINIC | Age: 29
End: 2024-05-28

## 2024-05-28 VITALS
TEMPERATURE: 98 F | HEART RATE: 81 BPM | WEIGHT: 130.13 LBS | SYSTOLIC BLOOD PRESSURE: 100 MMHG | HEIGHT: 61 IN | DIASTOLIC BLOOD PRESSURE: 60 MMHG | RESPIRATION RATE: 16 BRPM | BODY MASS INDEX: 24.57 KG/M2 | OXYGEN SATURATION: 99 %

## 2024-05-28 DIAGNOSIS — Z13.31 NEGATIVE DEPRESSION SCREENING: ICD-10-CM

## 2024-05-28 DIAGNOSIS — Z30.011 ENCOUNTER FOR INITIAL PRESCRIPTION OF CONTRACEPTIVE PILLS: ICD-10-CM

## 2024-05-28 DIAGNOSIS — Z23 NEED FOR VACCINATION: ICD-10-CM

## 2024-05-28 DIAGNOSIS — Z98.890 H/O LEEP: ICD-10-CM

## 2024-05-28 DIAGNOSIS — D06.9 CIN III (CERVICAL INTRAEPITHELIAL NEOPLASIA III): ICD-10-CM

## 2024-05-28 DIAGNOSIS — Z13.220 SCREENING CHOLESTEROL LEVEL: ICD-10-CM

## 2024-05-28 DIAGNOSIS — Z00.00 WELL ADULT EXAM: Primary | ICD-10-CM

## 2024-05-28 LAB
CONTROL LINE PRESENT WITH A CLEAR BACKGROUND (YES/NO): YES YES/NO
KIT LOT #: NORMAL NUMERIC
PREGNANCY TEST, URINE: NEGATIVE

## 2024-05-28 PROCEDURE — 90471 IMMUNIZATION ADMIN: CPT | Performed by: STUDENT IN AN ORGANIZED HEALTH CARE EDUCATION/TRAINING PROGRAM

## 2024-05-28 PROCEDURE — 99395 PREV VISIT EST AGE 18-39: CPT | Performed by: STUDENT IN AN ORGANIZED HEALTH CARE EDUCATION/TRAINING PROGRAM

## 2024-05-28 PROCEDURE — 90651 9VHPV VACCINE 2/3 DOSE IM: CPT | Performed by: STUDENT IN AN ORGANIZED HEALTH CARE EDUCATION/TRAINING PROGRAM

## 2024-05-28 PROCEDURE — 81025 URINE PREGNANCY TEST: CPT | Performed by: STUDENT IN AN ORGANIZED HEALTH CARE EDUCATION/TRAINING PROGRAM

## 2024-05-28 PROCEDURE — 3074F SYST BP LT 130 MM HG: CPT | Performed by: STUDENT IN AN ORGANIZED HEALTH CARE EDUCATION/TRAINING PROGRAM

## 2024-05-28 PROCEDURE — 3008F BODY MASS INDEX DOCD: CPT | Performed by: STUDENT IN AN ORGANIZED HEALTH CARE EDUCATION/TRAINING PROGRAM

## 2024-05-28 PROCEDURE — 3078F DIAST BP <80 MM HG: CPT | Performed by: STUDENT IN AN ORGANIZED HEALTH CARE EDUCATION/TRAINING PROGRAM

## 2024-05-28 RX ORDER — NORGESTIMATE AND ETHINYL ESTRADIOL 7DAYSX3 LO
1 KIT ORAL DAILY
Qty: 90 TABLET | Refills: 3 | Status: SHIPPED | OUTPATIENT
Start: 2024-05-28 | End: 2024-05-30

## 2024-05-28 NOTE — PROGRESS NOTES
Subjective:      Chief Complaint   Patient presents with    Physical     Last pap done 2 years ago with Dr. Atwood - has an appt on 7/29/24    OCP     Would like to discuss options - was taking pills about 5 years ago     HISTORY OF PRESENT ILLNESS  HPI  HPI obtained per patient report.  Joan Escalante is a pleasant 28 year old female presenting for her annual physical.   She would also like to resume her OCP. She has an upcoming appointment with her gynecologist in July for her pap.     PAST PATIENT HISTORY  Past Medical History:    Anemia    Anxiety    Cervical intraepithelial neoplasia (AMARJIT)    grade 2-3, Dr. Atwood    Depression    Keratosis pilaris    arms     Past Surgical History:   Procedure Laterality Date    Colposcopy, cervix w/upper adjacent vagina; w/biopsy(s), cervix      Enlarge breast with implant Bilateral     Leep  06/12/2019    Nasal surg proc unlisted      Other      septorhinoplasty     [place] teeth removed         CURRENT MEDICATIONS  Outpatient Medications Marked as Taking for the 5/28/24 encounter (Office Visit) with Vinnie Brooke MD   Medication Sig Dispense Refill    Norgestim-Eth Estrad Triphasic (ORTHO TRI-CYCLEN LO) 0.18/0.215/0.25 MG-25 MCG Oral Tab Take 1 tablet by mouth daily. 90 tablet 3       HEALTH MAINTENANCE  Immunization History   Administered Date(s) Administered    Covid-19 Vaccine Moderna 100 mcg/0.5 ml 05/16/2021, 06/13/2021    DTAP 08/08/1997, 10/02/1997, 12/05/1997, 09/04/1998, 06/13/2002    FLULAVAL 6 months & older 0.5 ml Prefilled syringe (06538) 09/30/2020    FLUZONE 6 months and older PFS 0.5 ml (66308) 11/01/2018, 11/02/2018    HEP A 06/28/2011, 06/05/2012    HEP A,Ped/Adol,(2 Dose) 06/05/2012    HEP B 08/08/1997, 10/20/1997, 03/03/1998    HEP B, Adult 03/16/2022    HIB 08/08/1997, 10/02/1997, 12/05/1997, 09/04/1998    Hpv Virus Vaccine 9 Opal Im 04/14/2021, 07/08/2022    MMR 09/04/1998, 06/13/2002    Meningococcal-Menactra 06/05/2008, 06/17/2013     Meningococcal-Menveo 2month-55yr 06/17/2013    TDAP 06/05/2008, 03/12/2022    Varicella 06/01/1998, 06/05/2008   Pended Date(s) Pended    Hpv Virus Vaccine 9 Opal Im 07/08/2022, 05/28/2024       ALLERGIES AND DRUG REACTIONS  No Known Allergies    Family History   Problem Relation Age of Onset    Other (Other) Mother         Schitzophrenic    Anemia Mother     Cancer Father     Colon Cancer Maternal Grandfather     Cancer Maternal Grandfather     Asthma Sister      Social History     Socioeconomic History    Marital status: Single   Tobacco Use    Smoking status: Never    Smokeless tobacco: Never   Vaping Use    Vaping status: Never Used   Substance and Sexual Activity    Alcohol use: Not Currently     Alcohol/week: 4.0 standard drinks of alcohol     Types: 4 Glasses of wine per week     Comment: 1 q 2 wks on weekends, 2-3 drinks, never a problems    Drug use: No    Sexual activity: Yes     Partners: Male     Birth control/protection: Condom   Other Topics Concern    Caffeine Concern No    Exercise Yes    Seat Belt Yes    Special Diet No    Stress Concern Yes    Weight Concern Yes     Social Determinants of Health      [facility]    Housing Stability       Review of Systems   All other systems reviewed and are negative.         Objective:      /60   Pulse 81   Temp 97.7 °F (36.5 °C) (Temporal)   Resp 16   Ht 5' 1\" (1.549 m)   Wt 130 lb 2 oz (59 kg)   LMP 04/29/2024 (Exact Date)   SpO2 99%   BMI 24.59 kg/m²   Body mass index is 24.59 kg/m².    Physical Exam  Vitals reviewed.   Constitutional:       General: She is not in acute distress.     Appearance: She is not ill-appearing, toxic-appearing or diaphoretic.   HENT:      Head: Normocephalic and atraumatic.   Eyes:      General: No scleral icterus.        Right eye: No discharge.         Left eye: No discharge.      Conjunctiva/sclera: Conjunctivae normal.   Cardiovascular:      Rate and Rhythm: Normal rate and regular  rhythm.      Heart sounds: Normal heart sounds.   Pulmonary:      Effort: Pulmonary effort is normal.      Breath sounds: Normal breath sounds.   Abdominal:      General: Abdomen is flat. There is no distension.   Musculoskeletal:      Cervical back: Neck supple.      Right lower leg: No edema.      Left lower leg: No edema.   Neurological:      Mental Status: She is alert and oriented to person, place, and time.            Assessment and Plan:      1. Well adult exam (Primary)  -     CBC With Differential With Platelet  -     Comp Metabolic Panel (14)  2. Screening cholesterol level  -     Lipid Panel  3. Need for vaccination  -     Human Papillomavirus 9-valent vaccine, Recombinant (Gardasil 9) HPV 9 [79754]  4. Negative depression screening  5. Encounter for initial prescription of contraceptive pills  -     Urine Pregnancy Test  -     Norgestim-Eth Estrad Triphasic; Take 1 tablet by mouth daily.  Dispense: 90 tablet; Refill: 3  6. AMARJIT III (cervical intraepithelial neoplasia III)  7. H/O LEEP    Return in about 1 year (around 5/28/2025).    - she is doing well  - annual lab orders were placed today  - she is due for the last dose of her HPV vaccine series, and she consented to administration today  - we reviewed her contraceptive options, and she wishes to resume the Ortho Tri-cyclen Lo that she took in the past. Recommended pap smear in July with her Gynecologist as planned    Patient verbalized understanding of assessment and recommendations. All questions and concerns were addressed.    Electronically signed by Vinnei Brooke MD

## 2024-05-30 ENCOUNTER — TELEPHONE (OUTPATIENT)
Dept: FAMILY MEDICINE CLINIC | Facility: CLINIC | Age: 29
End: 2024-05-30

## 2024-05-30 DIAGNOSIS — Z30.011 ENCOUNTER FOR INITIAL PRESCRIPTION OF CONTRACEPTIVE PILLS: ICD-10-CM

## 2024-05-30 RX ORDER — NORGESTIMATE AND ETHINYL ESTRADIOL 7DAYSX3 LO
1 KIT ORAL DAILY
Qty: 90 TABLET | Refills: 3 | Status: SHIPPED | OUTPATIENT
Start: 2024-05-30 | End: 2025-05-30

## 2024-05-30 NOTE — TELEPHONE ENCOUNTER
Patient requesting birth control prescription to be sent to another pharmacy. Patient states [facility] is now out of network, patient requesting to use [facility] on file.

## 2024-07-29 ENCOUNTER — OFFICE VISIT (OUTPATIENT)
Dept: OBGYN CLINIC | Facility: CLINIC | Age: 29
End: 2024-07-29
Payer: COMMERCIAL

## 2024-07-29 VITALS
SYSTOLIC BLOOD PRESSURE: 100 MMHG | DIASTOLIC BLOOD PRESSURE: 62 MMHG | HEART RATE: 87 BPM | BODY MASS INDEX: 25.68 KG/M2 | WEIGHT: 130.81 LBS | HEIGHT: 60 IN

## 2024-07-29 DIAGNOSIS — Z12.4 SCREENING FOR CERVICAL CANCER: ICD-10-CM

## 2024-07-29 DIAGNOSIS — Z01.419 WELL WOMAN EXAM WITH ROUTINE GYNECOLOGICAL EXAM: Primary | ICD-10-CM

## 2024-07-29 PROCEDURE — 3008F BODY MASS INDEX DOCD: CPT | Performed by: OBSTETRICS & GYNECOLOGY

## 2024-07-29 PROCEDURE — 88175 CYTOPATH C/V AUTO FLUID REDO: CPT | Performed by: OBSTETRICS & GYNECOLOGY

## 2024-07-29 PROCEDURE — 3078F DIAST BP <80 MM HG: CPT | Performed by: OBSTETRICS & GYNECOLOGY

## 2024-07-29 PROCEDURE — 3074F SYST BP LT 130 MM HG: CPT | Performed by: OBSTETRICS & GYNECOLOGY

## 2024-07-29 PROCEDURE — 87624 HPV HI-RISK TYP POOLED RSLT: CPT | Performed by: OBSTETRICS & GYNECOLOGY

## 2024-07-29 PROCEDURE — 99395 PREV VISIT EST AGE 18-39: CPT | Performed by: OBSTETRICS & GYNECOLOGY

## 2024-07-29 RX ORDER — PHENTERMINE HYDROCHLORIDE 37.5 MG/1
TABLET ORAL
COMMUNITY
Start: 2024-05-25

## 2024-07-29 NOTE — PROGRESS NOTES
[facility]  Obstetrics and Gynecology  History & Physical    CC: Patient presents for a well woman exam     Subjective:     HPI: Joan Escalante is a 29 year old  female here for a well women exam. Patient reports doing well.     OB History:  OB History    Para Term  AB Living   1 0 0 0 1 0   SAB IAB Ectopic Multiple Live Births   0 1 0 0 0      # Outcome Date GA Lbr Johnathan/2nd Weight Sex Type Anes PTL Lv   1 IAB  6w0d              Gyne History:  Hx Prior Abnormal Pap: Yes (leep)  Pap Date: 22 (wnl per pt)  Pap Result Notes: [facility]  Patient's last menstrual period was 2024 (exact date).    pap smear noted for HSIL on 2018  history of AMARJIT II but negative ECC noted on colposcopy 2018  Colposcopy AMARJIT III and ECC negative 2019  colposcopy and LEEP / AMARJIT III on 19 - AMARJIT I posterior with negative margins, AMARJIT II-III anterior with negative margins and ECC negative  Pap smear 2019 [facility]  NILM 2020  NILM, HPV neg 2021 and NILM, HPV neg 2022     history of STDs. Hx of Chlamydia.    Sexual history: Active? yes, male partner(s)  Birth control? condoms    Meds:  Current Outpatient Medications on File Prior to Visit   Medication Sig Dispense Refill    Phentermine HCl 37.5 MG Oral Tab 1 TAB(S) ORALLY EVERY MORNING BEFORE BREAKFAST (Patient not taking: Reported on 2024)      Norgestim-Eth Estrad Triphasic (ORTHO TRI-CYCLEN LO) 0.18/0.215/0.25 MG-25 MCG Oral Tab Take 1 tablet by mouth daily. (Patient not taking: Reported on 2024) 90 tablet 3     No current facility-administered medications on file prior to visit.       All:  No Known Allergies    PMH:  Past Medical History:    Anemia    Anxiety    Cervical intraepithelial neoplasia (AMARJIT)    grade 2-3, Dr. Atwood    Depression    Keratosis pilaris    arms       Immunization History:   Immunization History   Administered Date(s) Administered    Covid-19 Vaccine Moderna 100 mcg/0.5 ml  05/16/2021, 06/13/2021    DTAP 08/08/1997, 10/02/1997, 12/05/1997, 09/04/1998, 06/13/2002    FLULAVAL 6 months & older 0.5 ml Prefilled syringe (10005) 09/30/2020    FLUZONE 6 months and older PFS 0.5 ml (76166) 11/01/2018, 11/02/2018    Flucelvax 0.5ml Vaccine 10/14/2023    HEP A 06/28/2011, 06/05/2012    HEP B 08/08/1997, 10/20/1997, 03/03/1998    HEP B, Adult 03/16/2022    HIB 08/08/1997, 10/02/1997, 12/05/1997, 09/04/1998    Hepatitis B, Adult, Adjuvanted 05/22/2024    Hpv Virus Vaccine 9 Opal Im 04/14/2021, 07/08/2022, 05/28/2024    MMR 09/04/1998, 06/13/2002    Meningococcal-Menactra 06/05/2008    Meningococcal-Menveo 2month-55yr 06/17/2013    Pneumococcal Conjugate PCV20 05/22/2024    TDAP 06/05/2008, 03/12/2022, 05/22/2024    Varicella 06/01/1998, 06/05/2008   Pended Date(s) Pended    Hpv Virus Vaccine 9 Opal Im 07/08/2022       PSH:  Past Surgical History:   Procedure Laterality Date    Colposcopy, cervix w/upper adjacent vagina; w/biopsy(s), cervix      Enlarge breast with implant Bilateral     Leep  06/12/2019    Nasal surg proc unlisted      Other      septorhinoplasty     [place] teeth removed         Social History:  Social History     Socioeconomic History    Marital status: Single     Spouse name: Not on file    Number of children: Not on file    Years of education: Not on file    Highest education level: Not on file   Occupational History    Not on file   Tobacco Use    Smoking status: Never    Smokeless tobacco: Never   Vaping Use    Vaping status: Never Used   Substance and Sexual Activity    Alcohol use: Yes     Alcohol/week: 4.0 standard drinks of alcohol     Types: 4 Glasses of wine per week     Comment: socially    Drug use: No    Sexual activity: Not Currently     Partners: Male     Birth control/protection: Condom   Other Topics Concern    Caffeine Concern No    Exercise Yes    Seat Belt Yes    Special Diet No    Stress Concern Yes    Weight Concern Yes     Service Not Asked    Blood  Transfusions Not Asked    Occupational Exposure Not Asked    Hobby Hazards Not Asked    Sleep Concern Not Asked    Back Care Not Asked    Bike Helmet Not Asked    Self-Exams Not Asked   Social History Narrative    Not on file     Social Determinants of Health     Financial Resource Strain: Not on file   Food Insecurity: Not on file   Transportation Needs: Not on file   Physical Activity: Not on file   Stress: Not on file   Social Connections: Not on file   Housing Stability: Low Risk  (5/22/2024)    Received from [facility]    Housing Stability     Mortgage Payment Concerns?: Not on file     Number of Places Lived in the Last Year: Not on file     Unstable Housing?: Not on file         Patient feels unsafe or threatened?: denies    Abuse: deniew physical, sexual or mental.     Family History:  Family History   Problem Relation Age of Onset    Other (Other) Mother         Schitzophrenic    Anemia Mother     Cancer Father     Colon Cancer Maternal Grandfather     Cancer Maternal Grandfather     Asthma Sister        Health maintenance:  Mammogram (age 40 and q1-2yr): n/a  Colonoscopy (age 45 and q10yr): n/a    Review of Systems:  General: no complaints per category. See HPI for additional information.   Breast: no complaints per category. See HPI for additional information.   Respiratory: no complaints per category. See HPI for additional information.   Cardiovascular: no complaints per category. See HPI for additional information.   GI: no complaints per category. See HPI for additional information.   : no complaints per category. See HPI for additional information.   Heme: no complaints per category. See HPI for additional information.       Objective:     Vitals:    07/29/24 0956   BP: 100/62   Pulse: 87   Weight: 130 lb 12.8 oz (59.3 kg)   Height: 60\"         Body mass index is 25.55 kg/m².    General: AAO.NAD.   [facility] exam: normal peripheral perfusion  Chest: non-labored breathing, no tachypnea    Breast:  symmetric, no dominant or suspicious mass, no skin or nipple changes and no axillary adenopathy  Abdominal exam:  soft, nontender, nondistended  Pelvic exam:   VULVA: normal appearing vulva with no masses, tenderness or lesions  PERINEUM:  normal appearing, no lesions   URETHRAL MEATUS:  normal appearing, no lesions   VAGINA: normal appearing vagina with normal color and discharge, no lesions  CERVIX: normal appearing cervix without discharge or lesions  UTERUS: uterus is normal size, shape, consistency and nontender  ADNEXA: normal adnexa in size, nontender and no masses  PERIRECTAL: normal appearing, no lesions   Ext: non-tender, no edema    Assessment:     Joan Escalante is a 29 year old  female here for a well women exam.       Plan:       Problem List Items Addressed This Visit    None  Visit Diagnoses       Well woman exam with routine gynecological exam    -  Primary    Screening for cervical cancer        Relevant Orders    ThinPrep PAP with HPV Reflex Request B            Cervical cancer screening  - discussion held with the patient about ASCCP guidelines  - repeat pap smear today   Health maintenance  - encouraged to maintain weight at healthy BMI  - discussed importance of exercise and healthy eating  - self breast exam instructions provided         Problem List Items Addressed This Visit    None  Visit Diagnoses       Well woman exam with routine gynecological exam    -  Primary    Screening for cervical cancer        Relevant Orders    ThinPrep PAP with HPV Reflex Request B                  All of the findings and plan were discussed with the patient.  She notes understanding and agrees with the plan of care.  All questions were answered to the best of my ability at this time.      RTC in 1 year for a well woman exam or sooner if needed     Petty Atwood MD   [facility] - OBGYN      Discussed with patient that there will not be further notification of normal or benign results other than  receiving results on [employer]. A [employer] message or telephone call will be placed by the physician and/or office staff if results are abnormal.       Note to patient and family   The 21st Century Cures Act makes medical notes available to patients in the interest of transparency.  However, please be advised that this is a medical document.  It is intended as [id number] communication.  It is written and medical language may contain abbreviations or verbiage that are technical and unfamiliar.  It may appear blunt or direct.  Medical documents are intended to carry relevant information, facts as evident, and the clinical opinion of the practitioner.      This note could include assistance by Dragon voice recognition. Errors in content may be related to improper recognition by the system; efforts to review and correct have been done but errors may still exist.

## 2024-08-02 LAB
.: NORMAL
.: NORMAL

## 2024-08-05 LAB — HPV E6+E7 MRNA CVX QL NAA+PROBE: NEGATIVE

## 2024-09-21 ENCOUNTER — OFFICE VISIT (OUTPATIENT)
Dept: FAMILY MEDICINE CLINIC | Facility: CLINIC | Age: 29
End: 2024-09-21
Payer: COMMERCIAL

## 2024-09-21 VITALS
TEMPERATURE: 97 F | WEIGHT: 131.5 LBS | RESPIRATION RATE: 16 BRPM | HEART RATE: 80 BPM | DIASTOLIC BLOOD PRESSURE: 62 MMHG | BODY MASS INDEX: 25.82 KG/M2 | HEIGHT: 60 IN | SYSTOLIC BLOOD PRESSURE: 102 MMHG

## 2024-09-21 DIAGNOSIS — N63.22 MASS OF UPPER INNER QUADRANT OF LEFT BREAST: Primary | ICD-10-CM

## 2024-09-21 PROCEDURE — 3078F DIAST BP <80 MM HG: CPT | Performed by: STUDENT IN AN ORGANIZED HEALTH CARE EDUCATION/TRAINING PROGRAM

## 2024-09-21 PROCEDURE — 99213 OFFICE O/P EST LOW 20 MIN: CPT | Performed by: STUDENT IN AN ORGANIZED HEALTH CARE EDUCATION/TRAINING PROGRAM

## 2024-09-21 PROCEDURE — 3074F SYST BP LT 130 MM HG: CPT | Performed by: STUDENT IN AN ORGANIZED HEALTH CARE EDUCATION/TRAINING PROGRAM

## 2024-09-21 PROCEDURE — 3008F BODY MASS INDEX DOCD: CPT | Performed by: STUDENT IN AN ORGANIZED HEALTH CARE EDUCATION/TRAINING PROGRAM

## 2024-09-21 NOTE — PROGRESS NOTES
Subjective:      Chief Complaint   Patient presents with    Breast Pain     Redness - left breast - states it noticed it about 4 days ago     HISTORY OF PRESENT ILLNESS  HPI  HPI obtained per patient report.  Joan Escalante is a pleasant 29 year old female presenting due to a left breast lump.  4 days ago at the end of her menstrual period, she noticed a tender lump in her left breast with some overlying redness. Her symptoms have been improving somewhat over time, but she was concerned due to her history of breast implants.    PAST PATIENT HISTORY  Past Medical History:    Anemia    Anxiety    Cervical intraepithelial neoplasia (AMARJIT)    grade 2-3, Dr. Atwood    Depression    Keratosis pilaris    arms     Past Surgical History:   Procedure Laterality Date    Colposcopy, cervix w/upper adjacent vagina; w/biopsy(s), cervix      Enlarge breast with implant Bilateral     Leep  06/12/2019    Nasal surg proc unlisted      Other      septorhinoplasty     [place] teeth removed         CURRENT MEDICATIONS  No outpatient medications have been marked as taking for the 9/21/24 encounter (Office Visit) with Vinnie Brooke MD.       HEALTH MAINTENANCE  Immunization History   Administered Date(s) Administered    Covid-19 Vaccine Moderna 100 mcg/0.5 ml 05/16/2021, 06/13/2021    DTAP 08/08/1997, 10/02/1997, 12/05/1997, 09/04/1998, 06/13/2002    FLULAVAL 6 months & older 0.5 ml Prefilled syringe (97886) 09/30/2020    FLUZONE 6 months and older PFS 0.5 ml (92066) 11/01/2018, 11/02/2018    Flucelvax Influenza vaccine, trivalent (ccIIV3), 0.5mL IM 10/14/2023    HEP A 06/28/2011, 06/05/2012    HEP B 08/08/1997, 10/20/1997, 03/03/1998    HEP B, Adult 03/16/2022    HIB 08/08/1997, 10/02/1997, 12/05/1997, 09/04/1998    Hepatitis B, Adult, Adjuvanted 05/22/2024    Hpv Virus Vaccine 9 Opal Im 04/14/2021, 07/08/2022, 05/28/2024    MMR 09/04/1998, 06/13/2002    Meningococcal-Menactra 06/05/2008    Meningococcal-Menveo 2month-55yr 06/17/2013     Pneumococcal Conjugate PCV20 05/22/2024    TDAP 06/05/2008, 03/12/2022, 05/22/2024    Varicella 06/01/1998, 06/05/2008   Pended Date(s) Pended    Hpv Virus Vaccine 9 Opal Im 07/08/2022       ALLERGIES AND DRUG REACTIONS  No Known Allergies    Family History   Problem Relation Age of Onset    Other (Other) Mother         Schitzophrenic    Anemia Mother     Cancer Father     Colon Cancer Maternal Grandfather     Cancer Maternal Grandfather     Asthma Sister      Social History     Socioeconomic History    Marital status: Single   Tobacco Use    Smoking status: Never    Smokeless tobacco: Never   Vaping Use    Vaping status: Never Used   Substance and Sexual Activity    Alcohol use: Yes     Alcohol/week: 4.0 standard drinks of alcohol     Types: 4 Glasses of wine per week     Comment: socially    Drug use: No    Sexual activity: Not Currently     Partners: Male     Birth control/protection: Condom   Other Topics Concern    Caffeine Concern No    Exercise Yes    Seat Belt Yes    Special Diet No    Stress Concern Yes    Weight Concern Yes     Social Determinants of Health      Received from [facility]    Housing Stability       Review of Systems   All other systems reviewed and are negative.         Objective:      /62   Pulse 80   Temp 97.2 °F (36.2 °C) (Temporal)   Resp 16   Ht 5' (1.524 m)   Wt 131 lb 8 oz (59.6 kg)   LMP 09/08/2024 (Exact Date)   BMI 25.68 kg/m²   Body mass index is 25.68 kg/m².    Physical Exam  Vitals reviewed.   Constitutional:       General: She is not in acute distress.     Appearance: She is not ill-appearing, toxic-appearing or diaphoretic.   HENT:      Head: Normocephalic and atraumatic.   Eyes:      General: No scleral icterus.        Right eye: No discharge.         Left eye: No discharge.      Extraocular Movements: Extraocular movements intact.      Conjunctiva/sclera: Conjunctivae normal.   Cardiovascular:      Rate and Rhythm: Normal rate.   Pulmonary:       Effort: Pulmonary effort is normal.   Chest:   Breasts:     Right: Normal.      Left: Mass (mildly tender, mild to moderately firm nodule approx 1cm in diameter at the 11 o'clock position. there is no associated erythema/edema/fluctuance/discharge) and tenderness present. No swelling, bleeding, inverted nipple, nipple discharge or skin change.      Comments: No abnormalities of breast implants appreciated on exam  Abdominal:      General: There is no distension.   Musculoskeletal:      Cervical back: Neck supple.   Skin:     General: Skin is warm and dry.      Coloration: Skin is not jaundiced or pale.      Findings: No bruising, erythema or rash.   Neurological:      Mental Status: She is alert and oriented to person, place, and time.   Psychiatric:         Mood and Affect: Mood normal.            Assessment and Plan:      1. Mass of upper inner quadrant of left breast (Primary)  -     US BREAST LEFT COMPLETE (CPT=76641); Future; Expected date: 09/21/2024    Return if symptoms worsen or fail to improve.    - we discussed that her L breast lump is most likely due to fibrocystic breast tissue and hormonal changes in breast tissue, which are benign  - her symptoms have been mildly improving over time  - recommended supportive care including wearing a supportive bra, warm compresses, and ibuprofen or tylenol as needed for pain  - if her symptoms do not improve by 1 week from symptom onset, recommended proceeding with a breast ultrasound to evaluate the nodule and her breast implant integrity    Patient verbalized understanding of assessment and recommendations. All questions and concerns were addressed.    Electronically signed by Vinnie Brooke MD

## 2024-11-03 ENCOUNTER — PATIENT MESSAGE (OUTPATIENT)
Dept: FAMILY MEDICINE CLINIC | Facility: CLINIC | Age: 29
End: 2024-11-03

## 2024-11-04 DIAGNOSIS — Z30.011 ENCOUNTER FOR INITIAL PRESCRIPTION OF CONTRACEPTIVE PILLS: ICD-10-CM

## 2024-11-05 RX ORDER — NORGESTIMATE AND ETHINYL ESTRADIOL 7DAYSX3 LO
1 KIT ORAL DAILY
Qty: 90 TABLET | Refills: 3 | Status: SHIPPED | OUTPATIENT
Start: 2024-11-05 | End: 2025-11-05

## 2024-11-05 NOTE — TELEPHONE ENCOUNTER
Requested Renewals     Norgestim-Eth Estrad Triphasic (ORTHO TRI-CYCLEN LO) 0.18/0.215/0.25 MG-25 MCG Oral Tab         The original prescription was discontinued on 9/21/2024 by Janny Duncan MA for the following reason: Patient discontinued. Renewing this prescription may not be appropriate.    Sig: Take 1 tablet by mouth daily.    Disp: 90 tablet    Refills: 3    Start: 11/4/2024 - 11/4/2025    Class: Normal    Non-formulary For: Encounter for initial prescription of contraceptive pills    Last ordered: 5 months ago (5/30/2024) by Vinnie rBooke MD              No future appointments.  LOV: 9/21/24  Last physical exam done 5/28/24    -see below message, medication pended for review.    Joan Escalante to [facility] Clinical Staff (supporting Vinnie Brokoe MD)       11/3/24  5:33 PM  Hello,     Can you please refill the prescription for my birth control? I did not see it listed on the yash under my medications. Can you please have it sent to [facility]? I had it sent to [facility] last time but it was not covered by my insurance there.      Thank you in advance!

## 2025-01-08 ENCOUNTER — OFFICE VISIT (OUTPATIENT)
Dept: FAMILY MEDICINE CLINIC | Facility: CLINIC | Age: 30
End: 2025-01-08
Payer: COMMERCIAL

## 2025-01-08 VITALS
HEART RATE: 62 BPM | DIASTOLIC BLOOD PRESSURE: 70 MMHG | TEMPERATURE: 98 F | SYSTOLIC BLOOD PRESSURE: 124 MMHG | WEIGHT: 140 LBS | RESPIRATION RATE: 16 BRPM | BODY MASS INDEX: 26.43 KG/M2 | HEIGHT: 61 IN | OXYGEN SATURATION: 100 %

## 2025-01-08 DIAGNOSIS — H92.02 OTALGIA OF LEFT EAR: Primary | ICD-10-CM

## 2025-01-08 DIAGNOSIS — M79.12 STERNOCLEIDOMASTOID MUSCLE TENDERNESS: ICD-10-CM

## 2025-01-08 PROCEDURE — 3074F SYST BP LT 130 MM HG: CPT | Performed by: FAMILY MEDICINE

## 2025-01-08 PROCEDURE — 3078F DIAST BP <80 MM HG: CPT | Performed by: FAMILY MEDICINE

## 2025-01-08 PROCEDURE — 99213 OFFICE O/P EST LOW 20 MIN: CPT | Performed by: FAMILY MEDICINE

## 2025-01-08 PROCEDURE — 3008F BODY MASS INDEX DOCD: CPT | Performed by: FAMILY MEDICINE

## 2025-01-08 RX ORDER — DEXTROAMPHETAMINE SACCHARATE, AMPHETAMINE ASPARTATE, DEXTROAMPHETAMINE SULFATE AND AMPHETAMINE SULFATE 2.5; 2.5; 2.5; 2.5 MG/1; MG/1; MG/1; MG/1
TABLET ORAL
COMMUNITY
Start: 2025-01-07

## 2025-01-08 NOTE — PATIENT INSTRUCTIONS
Monitor left ear discomfort.   For sternocleidomastoid tenderness:  Use ibuprofen for pain as needed.   Work on gentle stretching and massage to the musculature to help relieve pain.       For possible eustachian tube dysfunction:   Use flonase-- 2 sprays each nostril at bedtime.  To make sure you're using it properly-- look at the floor, insert the nozzle into the nasal opening and aim the spray toward the ceiling.    Use a gentle sniff when you spray the flonase into the nostril. Avoid using a big \"sniff\" which will send the spray to the back of the throat.  Repeat on the other side.   Don't blow nose for 30 minutes after nasal spray use if possible.      If no better in 1 week, follow-up for further evaluation.

## 2025-01-08 NOTE — PROGRESS NOTES
CHIEF COMPLAINT:     Chief Complaint   Patient presents with    Ear Problem     L ear ringing and difficulty hearing since 12/14, started hurting today       HPI:   Joan Escalante is a 29 year old female who presents to clinic today with complaints of left ear pain that presented today. Pt reports that 2 weeks ago she experienced a low noise from a sparkler that was held close to the left side of her ear. After the loud noise pt reports that she had difficulty hearing. She was going to get it evaluated but was out of town.  Pt noted ear pain this morning and is still noting some muffled hearing. .  Pain is described as constant dull ache hurts when she swallows.  Patient denies history of ear infections.     Associated symptoms:  Patient reports decreased hearing.  Patient denies fever. Patient denies hearing loss. Patient denies drainage. Patient denies use of Q-tips to clean the ears. Patient denies nasal congestion.     Current Outpatient Medications   Medication Sig Dispense Refill    Norgestim-Eth Estrad Triphasic (ORTHO TRI-CYCLEN LO) 0.18/0.215/0.25 MG-25 MCG Oral Tab Take 1 tablet by mouth daily. 90 tablet 3    amphetamine-dextroamphetamine 10 MG Oral Tab  (Patient not taking: Reported on 1/8/2025)       No current facility-administered medications for this visit.      Past Medical History:    Anemia    Anxiety    Cervical intraepithelial neoplasia (AMARJIT)    grade 2-3, Dr. Atwood    Depression    Keratosis pilaris    arms      Social History:  Social History     Socioeconomic History    Marital status: Single   Tobacco Use    Smoking status: Never    Smokeless tobacco: Never   Vaping Use    Vaping status: Never Used   Substance and Sexual Activity    Alcohol use: Yes     Alcohol/week: 4.0 standard drinks of alcohol     Types: 4 Glasses of wine per week     Comment: socially    Drug use: No    Sexual activity: Not Currently     Partners: Male     Birth control/protection: Condom   Other Topics Concern     Caffeine Concern No    Exercise Yes    Seat Belt Yes    Special Diet No    Stress Concern Yes    Weight Concern Yes     Social Drivers of Health      Received from [facility]    Housing Stability        REVIEW OF SYSTEMS:   GENERAL: Feeling well otherwise.    SKIN: no unusual skin lesions or rashes  HEENT: See HPI  LUNGS: No cough, shortness of breath, or wheezing.  CARDIOVASCULAR: No chest pain, palpitations  GI: No N/V/C/D. No abdominal pain.  NEURO: denies headaches or dizziness    EXAM:   /70   Pulse 62   Temp 98.2 °F (36.8 °C)   Resp 16   Ht 5' 1\" (1.549 m)   Wt 140 lb (63.5 kg)   LMP 12/30/2024 (Exact Date)   SpO2 100%   BMI 26.45 kg/m²   GENERAL: well developed, well nourished,in no apparent distress  SKIN: no rashes,no suspicious lesions  HEAD: atraumatic, normocephalic  EYES: conjunctivae clear, EOM intact  EARS: Tragus non tender on palpation bilaterally. External auditory canals: patent b/l.  Right TM: pearly, no bulging, no retraction,no effusion, bony landmarks present.  Left TM: pearly, no bulging, no retraction,no effusion, bony landmarks present. No sign of trauma or perforation of the TM.  NOSE: nostrils patent, no exudates, nasal mucosa pink and noninflamed  THROAT: oral mucosa pink, moist. Posterior pharynx is not erythematous or injected. No exudates.  NECK: supple, non-tender  LUNGS: clear to auscultation bilaterally, no wheezes or rhonchi. Breathing is non labored.  CARDIO: RRR without murmur  EXTREMITIES: no cyanosis, clubbing or edema  LYMPH: no lymphadenopathy.      ASSESSMENT AND PLAN:   Joan Escalante is a 29 year old female who presents with:    ASSESSMENT:  Encounter Diagnoses   Name Primary?    Otalgia of left ear Yes    Sternocleidomastoid muscle tenderness        PLAN: Meds as listed below.  Comfort measures as described in Patient Instructions    Meds & Refills for this Visit:  Requested Prescriptions      No prescriptions requested or ordered in  this encounter         Risk and benefits of medication discussed. Stressed importance of completing full course of antibiotic.     Tylenol/Motrin prn pain.      Call or return if s/sx worsen, do not improve in 3 days, or if fever of 100.4 or greater persists for 72 hours.    Patient Instructions   Monitor left ear discomfort.   For sternocleidomastoid tenderness:  Use ibuprofen for pain as needed.   Work on gentle stretching and massage to the musculature to help relieve pain.       For possible eustachian tube dysfunction:   Use flonase-- 2 sprays each nostril at bedtime.  To make sure you're using it properly-- look at the floor, insert the nozzle into the nasal opening and aim the spray toward the ceiling.    Use a gentle sniff when you spray the flonase into the nostril. Avoid using a big \"sniff\" which will send the spray to the back of the throat.  Repeat on the other side.   Don't blow nose for 30 minutes after nasal spray use if possible.      If no better in 1 week, follow-up for further evaluation.       Patient voiced understand and is in agreement with treatment plan.

## 2025-04-15 ENCOUNTER — HOSPITAL ENCOUNTER (OUTPATIENT)
Age: 30
Discharge: HOME | End: 2025-04-15
Payer: COMMERCIAL

## 2025-04-15 VITALS
SYSTOLIC BLOOD PRESSURE: 128 MMHG | BODY MASS INDEX: 25.68 KG/M2 | OXYGEN SATURATION: 100 % | HEART RATE: 86 BPM | DIASTOLIC BLOOD PRESSURE: 57 MMHG | HEIGHT: 61 IN | TEMPERATURE: 98 F | RESPIRATION RATE: 18 BRPM | WEIGHT: 136 LBS

## 2025-04-15 DIAGNOSIS — B96.89 BV (BACTERIAL VAGINOSIS): ICD-10-CM

## 2025-04-15 DIAGNOSIS — Z11.3 SCREENING EXAMINATION FOR STD (SEXUALLY TRANSMITTED DISEASE): ICD-10-CM

## 2025-04-15 DIAGNOSIS — N76.0 BV (BACTERIAL VAGINOSIS): ICD-10-CM

## 2025-04-15 DIAGNOSIS — J40 SINOBRONCHITIS: Primary | ICD-10-CM

## 2025-04-15 DIAGNOSIS — J32.9 SINOBRONCHITIS: Primary | ICD-10-CM

## 2025-04-15 PROCEDURE — 87591 N.GONORRHOEAE DNA AMP PROB: CPT | Performed by: NURSE PRACTITIONER

## 2025-04-15 PROCEDURE — 99214 OFFICE O/P EST MOD 30 MIN: CPT

## 2025-04-15 PROCEDURE — 81514 NFCT DS BV&VAGINITIS DNA ALG: CPT | Performed by: NURSE PRACTITIONER

## 2025-04-15 PROCEDURE — 87491 CHLMYD TRACH DNA AMP PROBE: CPT | Performed by: NURSE PRACTITIONER

## 2025-04-15 PROCEDURE — 99213 OFFICE O/P EST LOW 20 MIN: CPT

## 2025-04-15 NOTE — ED PROVIDER NOTES
Patient Seen in: [facility]      History     Chief Complaint   Patient presents with    Cough/URI    Sexually Transmitted Infection Screen     Stated Complaint: URI /STD TEST    Subjective:   HPI  29-year-old female presents complaining of cough with nasal congestion with sinus pressure and green drainage that started on April 6.  She denies any chest pain or shortness of breath.  She would also like STD testing without any symptoms.  Objective:     Past Medical History:    Anemia    Anxiety    Cervical intraepithelial neoplasia (AMARJIT)    grade 2-3, Dr. Atwood    Depression    Keratosis pilaris    arms              Past Surgical History:   Procedure Laterality Date    Colposcopy, cervix w/upper adjacent vagina; w/biopsy(s), cervix      Enlarge breast with implant Bilateral     Leep  06/12/2019    Nasal surg proc unlisted      Other      septorhinoplasty     [place] teeth removed                  Social History     Socioeconomic History    Marital status: Single   Tobacco Use    Smoking status: Never    Smokeless tobacco: Never   Vaping Use    Vaping status: Never Used   Substance and Sexual Activity    Alcohol use: Yes     Alcohol/week: 4.0 standard drinks of alcohol     Types: 4 Glasses of wine per week     Comment: socially    Drug use: No    Sexual activity: Not Currently     Partners: Male     Birth control/protection: Condom   Other Topics Concern    Caffeine Concern No    Exercise Yes    Seat Belt Yes    Special Diet No    Stress Concern Yes    Weight Concern Yes     Social Drivers of Health      Received from [facility] [place] [place]    Housing Stability              Review of Systems   All other systems reviewed and are negative.      Positive for stated complaint: URI /STD TEST  Other systems are as noted in HPI.  Constitutional and vital signs reviewed.      All other systems reviewed and negative except as noted above.                  Physical Exam     ED Triage Vitals [04/15/25  1431]   /57   Pulse 86   Resp 18   Temp 98.1 °F (36.7 °C)   Temp src Oral   SpO2 100 %   O2 Device None (Room air)       Current Vitals:   Vital Signs  BP: 128/57  Pulse: 86  Resp: 18  Temp: 98.1 °F (36.7 °C)  Temp src: Oral    Oxygen Therapy  SpO2: 100 %  O2 Device: None (Room air)        Physical Exam  Vitals and nursing note reviewed. Exam conducted with a chaperone present (Sarai PEDROZA).   Constitutional:       General: She is not in acute distress.     Appearance: She is well-developed. She is not ill-appearing or toxic-appearing.   HENT:      Right Ear: Tympanic membrane, ear canal and external ear normal.      Left Ear: Tympanic membrane, ear canal and external ear normal.      Nose: Congestion present. No rhinorrhea.      Mouth/Throat:      Pharynx: No oropharyngeal exudate or posterior oropharyngeal erythema.   Cardiovascular:      Rate and Rhythm: Normal rate and regular rhythm.      Heart sounds: Normal heart sounds.   Pulmonary:      Effort: Pulmonary effort is normal. No respiratory distress.      Breath sounds: Normal breath sounds. No wheezing.   Genitourinary:     Vagina: No vaginal discharge.   Skin:     General: Skin is warm and dry.   Neurological:      Mental Status: She is alert and oriented to person, place, and time.           ED Course     Labs Reviewed   VAGINITIS VAGINOSIS PCR PANEL - Abnormal; Notable for the following components:       Result Value    Bacterial Vaginosis Positive (*)     All other components within normal limits    Narrative:     This assay utilizes RT-PCR to detect the DNA of Gardnerella vaginalis, Lactobacillus spp. (L. crispatus and L. jensenii), Atopobium vaginae, Bacterial Vaginosis Associated Bacteria-2 (BVAB-2), and Megasphaera-1. An algorithm is used to determine if the targets detected represent a vaginal microbiome consistent with bacterial vaginosis or normal vaginal bianca.  FDA approval was based on data in the 18 years and over population.        CHLAMYDIA/GONOCOCCUS, JUNE          Results                                 [facility]     Medical Decision Making  29-year-old female presents complaining of cough with nasal congestion with sinus pressure and green drainage that started on April 6.  She denies any chest pain or shortness of breath.  She would also like STD testing without any symptoms.    Pertinent Labs & Imaging studies reviewed. (See chart for details).  Patient coming in with congestion, STD testing.   Differential diagnosis includes but not limited to congestion, viral illness, sinusitis, bronchitis, pneumonia, STD screening, trichomonas, gonorrhea, chlamydia  Labs reviewed GC and vaginitis sent.   Will treat for sinobronchitis, STD screening.  Will discharge on Augmentin with GC and vaginitis pending. Patient/Parent is comfortable with this plan.    Overall Pt looks good. Non-toxic, well-hydrated and in no respiratory distress. Vital signs are reassuring. Exam is reassuring. I do not believe pt requires and additional diagnostic studies or intervention. I believe pt can be discharged home to continue evaluation as an outpatient. Follow-up provider given. Discharge instructions given and reviewed. Return for any problems. All understand and agree with the plan.        Problems Addressed:  Screening examination for STD (sexually transmitted disease): acute illness or injury  Sinobronchitis: acute illness or injury        Disposition and Plan     Clinical Impression:  1. Sinobronchitis    2. Screening examination for STD (sexually transmitted disease)    3. BV (bacterial vaginosis)         Disposition:  Discharge  4/15/2025  2:55 pm    Follow-up:  No follow-up provider specified.        Medications Prescribed:  Discharge Medication List as of 4/15/2025  2:59 PM        START taking these medications    Details   amoxicillin clavulanate 875-125 MG Oral Tab Take 1 tablet by mouth 2 (two) times daily for 10 days., Normal, Disp-20 tablet, R-0              Supplementary Documentation:

## 2025-04-15 NOTE — ED INITIAL ASSESSMENT (HPI)
Cough and congestion since 4/6. Noticed started getting sick last 2 days of her [place] trip. No fevers. Would like STD screening

## 2025-04-15 NOTE — DISCHARGE INSTRUCTIONS
Antibiotic as directed.  We will call if vaginitis or GC is positive.  Use Mucinex for congestion and cough.

## 2025-04-16 LAB
BV BACTERIA DNA VAG QL NAA+PROBE: POSITIVE
C GLABRATA DNA VAG QL NAA+PROBE: NEGATIVE
C KRUSEI DNA VAG QL NAA+PROBE: NEGATIVE
C TRACH DNA SPEC QL NAA+PROBE: NEGATIVE
CANDIDA DNA VAG QL NAA+PROBE: NEGATIVE
N GONORRHOEA DNA SPEC QL NAA+PROBE: NEGATIVE
T VAGINALIS DNA VAG QL NAA+PROBE: NEGATIVE

## 2025-04-16 RX ORDER — METRONIDAZOLE 500 MG/1
500 TABLET ORAL 2 TIMES DAILY
Qty: 14 TABLET | Refills: 0 | Status: SHIPPED | OUTPATIENT
Start: 2025-04-16 | End: 2025-04-23

## 2025-04-16 NOTE — PROGRESS NOTES
Called to follow up with patient. Instructions provided and all questions answered. Patient verbalized understanding.

## 2025-06-23 ENCOUNTER — TELEPHONE (OUTPATIENT)
Dept: OBGYN CLINIC | Facility: CLINIC | Age: 30
End: 2025-06-23

## 2025-07-22 ENCOUNTER — RESULTS FOLLOW-UP (OUTPATIENT)
Dept: OBGYN CLINIC | Facility: CLINIC | Age: 30
End: 2025-07-22

## 2025-07-22 ENCOUNTER — LAB ENCOUNTER (OUTPATIENT)
Dept: LAB | Age: 30
End: 2025-07-22
Attending: NURSE PRACTITIONER
Payer: COMMERCIAL

## 2025-07-22 ENCOUNTER — OFFICE VISIT (OUTPATIENT)
Dept: OBGYN CLINIC | Facility: CLINIC | Age: 30
End: 2025-07-22
Payer: COMMERCIAL

## 2025-07-22 VITALS
SYSTOLIC BLOOD PRESSURE: 116 MMHG | HEART RATE: 63 BPM | DIASTOLIC BLOOD PRESSURE: 72 MMHG | BODY MASS INDEX: 27.56 KG/M2 | WEIGHT: 146 LBS | HEIGHT: 61 IN

## 2025-07-22 DIAGNOSIS — K92.1 BLOODY STOOL: ICD-10-CM

## 2025-07-22 DIAGNOSIS — Z30.011 INITIATION OF ORAL CONTRACEPTION: Primary | ICD-10-CM

## 2025-07-22 DIAGNOSIS — N92.0 MENORRHAGIA WITH REGULAR CYCLE: ICD-10-CM

## 2025-07-22 DIAGNOSIS — N93.0 BLEEDING AFTER INTERCOURSE: ICD-10-CM

## 2025-07-22 DIAGNOSIS — Z13.29 SCREENING FOR THYROID DISORDER: ICD-10-CM

## 2025-07-22 DIAGNOSIS — Z13.1 SCREENING FOR DIABETES MELLITUS: ICD-10-CM

## 2025-07-22 DIAGNOSIS — N94.6 DYSMENORRHEA: ICD-10-CM

## 2025-07-22 LAB
CONTROL LINE PRESENT WITH A CLEAR BACKGROUND (YES/NO): YES YES/NO
EST. AVERAGE GLUCOSE BLD GHB EST-MCNC: 91 MG/DL (ref 68–126)
HBA1C MFR BLD: 4.8 % (ref ?–5.7)
KIT LOT #: NORMAL NUMERIC
PREGNANCY TEST, URINE: NEGATIVE
TSI SER-ACNC: 0.83 UIU/ML (ref 0.55–4.78)

## 2025-07-22 PROCEDURE — 3008F BODY MASS INDEX DOCD: CPT | Performed by: NURSE PRACTITIONER

## 2025-07-22 PROCEDURE — 83036 HEMOGLOBIN GLYCOSYLATED A1C: CPT | Performed by: NURSE PRACTITIONER

## 2025-07-22 PROCEDURE — 84443 ASSAY THYROID STIM HORMONE: CPT | Performed by: NURSE PRACTITIONER

## 2025-07-22 PROCEDURE — 99215 OFFICE O/P EST HI 40 MIN: CPT | Performed by: NURSE PRACTITIONER

## 2025-07-22 PROCEDURE — 99459 PELVIC EXAMINATION: CPT | Performed by: NURSE PRACTITIONER

## 2025-07-22 PROCEDURE — 3074F SYST BP LT 130 MM HG: CPT | Performed by: NURSE PRACTITIONER

## 2025-07-22 PROCEDURE — 3078F DIAST BP <80 MM HG: CPT | Performed by: NURSE PRACTITIONER

## 2025-07-22 PROCEDURE — 81025 URINE PREGNANCY TEST: CPT | Performed by: NURSE PRACTITIONER

## 2025-07-22 RX ORDER — LIDOCAINE HYDROCHLORIDE 20 MG/ML
1 SOLUTION ORAL; TOPICAL DAILY
COMMUNITY
Start: 2025-04-15

## 2025-07-22 RX ORDER — DEXTROAMPHETAMINE SACCHARATE, AMPHETAMINE ASPARTATE MONOHYDRATE, DEXTROAMPHETAMINE SULFATE AND AMPHETAMINE SULFATE 2.5; 2.5; 2.5; 2.5 MG/1; MG/1; MG/1; MG/1
10 CAPSULE, EXTENDED RELEASE ORAL EVERY MORNING
COMMUNITY

## 2025-07-22 RX ORDER — DROSPIRENONE AND ETHINYL ESTRADIOL 0.02-3(28)
1 KIT ORAL DAILY
Qty: 84 TABLET | Refills: 0 | Status: SHIPPED | OUTPATIENT
Start: 2025-07-22

## 2025-07-22 NOTE — PROGRESS NOTES
Subjective:  30 year old    Chief Complaint   Patient presents with    Consult     Pt would like to discuss fatigue, severe menstrual cramps, heavy mens, blood in stool, blood after intercourse, high cortisol, low iron, and difficulty losing weight; would like to screen for possible PCOS, possible endometriosis.      Pt here today with multiple concerns specifically  Fatigue  Dysmenorrhea  Menorrhagia  Bloody stool  Postcoital bleeding  High cortisol  Low iron  Difficulty losing weight - especially lower belly   She has concerns that she may have PCOS or endometriosis    Pt is having regular monthly menses  Notes heavy bleeding - this has been consistent for many years  Also notes significant cramping with menses  No difficulty with bowel or bladder    Postcoital bleeding  No abnormal discharge or odor  Also occurs when using intimate products    Denies personal/family hx VTE  Denies migraine with aura  Denies smoking    Review of Systems:  Pertinent items are noted in the HPI.    Objective:  /72   Pulse 63   Ht 61\"   Wt 146 lb (66.2 kg)   LMP 2025 (Exact Date)       Physical Examination:  General appearance: Well dressed, well nourished in no apparent distress  Neurologic/Psychiatric: Alert and oriented to person, place and time, mood normal, affect appropriate  Abdomen: Soft, non-tender, non-distended, no masses, no hepatosplenomegaly, no hernias, no inguinal lymphadenopathy  Pelvic:    External genitalia- Normal, Bartholin's, urethra, skeins glands normal   Vagina- No vaginal lesions, physiologic discharge   Cervix- No lesions, long/closed, no cervical motion tenderness   Uterus- Normal, non-tender, no masses   Adnexa-  Non-tender, no masses    Assessment/Plan:      Diagnoses and all orders for this visit:    Initiation of oral contraception  -     Urine Preg Test [08710] - negative  - no contraindications  -     Drospirenone-Ethinyl Estradiol (KRISTINA) 3-0.02 MG Oral Tab; Take 1 tablet by  mouth daily.  - r/b/a discussed  - 3 month follow up for medication management    Bleeding after intercourse  -     Vaginitis Vaginosis PCR Panel; Future  - declines GC  - Hx cervical dysplasia  - last pap 7/2024 - normal - due for pap soon  - to follow up with new/worsening symptoms or no improvement    Screening for diabetes mellitus  -     Hemoglobin A1C    Screening for thyroid disorder  -     TSH W Reflex To Free T4    Dysmenorrhea  - reviewed dysmenorrhea  - discussed possibility of endometriosis and diagnosis of endometriosis with exploratory laparoscopy  - we discussed treatment with NSAIDs vs hormonal contraceptive  - pt desires trial of OCP      -  Drospirenone-Ethinyl Estradiol (KRISTINA) 3-0.02 MG Oral Tab; Take 1 tablet by mouth daily.  - to follow up with new/worsening symptoms or no improvement    Menorrhagia with regular cycle  - discussed menorrhagia  - reviewed treatment with NSAIDs vs hormonal therapy  - pt desires trial of OCP      - Drospirenone-Ethinyl Estradiol (KRISTINA) 3-0.02 MG Oral Tab; Take 1 tablet by mouth daily.  - to follow up with new/worsening symptoms or no improvement    Bloody stool  - referral placed to gastro      Return in about 3 months (around 10/22/2025) for medication management .

## 2025-07-23 NOTE — TELEPHONE ENCOUNTER
Spoke with patient in regard to coming back to the office to give another sample for a lab test. Patient will be returning 7/24/25 at 0730 to see Araceli for another sample collection.

## 2025-07-24 ENCOUNTER — OFFICE VISIT (OUTPATIENT)
Dept: OBGYN CLINIC | Facility: CLINIC | Age: 30
End: 2025-07-24
Payer: COMMERCIAL

## 2025-07-24 DIAGNOSIS — N93.0 POSTCOITAL BLEEDING: Primary | ICD-10-CM

## 2025-07-24 PROCEDURE — 81514 NFCT DS BV&VAGINITIS DNA ALG: CPT | Performed by: NURSE PRACTITIONER

## 2025-07-24 NOTE — PROGRESS NOTES
Subjective:  30 year old    Chief Complaint   Patient presents with    Other     Re swab     Pt here today for reswab  See OV note     Review of Systems:  Pertinent items are noted in the HPI.    Objective:  LMP 2025 (Exact Date)       Physical Examination:  General appearance: Well dressed, well nourished in no apparent distress  Neurologic/Psychiatric: Alert and oriented to person, place and time, mood normal, affect appropriate  Pelvic:    External genitalia- Normal, Bartholin's, urethra, skeins glands normal       Assessment/Plan:      Diagnoses and all orders for this visit:    Postcoital bleeding        Return if symptoms worsen or fail to improve.

## 2025-07-25 LAB
BV BACTERIA DNA VAG QL NAA+PROBE: NEGATIVE
C GLABRATA DNA VAG QL NAA+PROBE: NEGATIVE
C KRUSEI DNA VAG QL NAA+PROBE: NEGATIVE
CANDIDA DNA VAG QL NAA+PROBE: NEGATIVE
T VAGINALIS DNA VAG QL NAA+PROBE: NEGATIVE

## 2025-08-20 ENCOUNTER — HOSPITAL ENCOUNTER (OUTPATIENT)
Dept: ULTRASOUND IMAGING | Age: 30
Discharge: HOME | End: 2025-08-20
Attending: NURSE PRACTITIONER

## 2025-08-20 DIAGNOSIS — N93.0 POSTCOITAL BLEEDING: ICD-10-CM

## 2025-08-20 PROCEDURE — 76830 TRANSVAGINAL US NON-OB: CPT | Performed by: NURSE PRACTITIONER

## 2025-08-20 PROCEDURE — 76856 US EXAM PELVIC COMPLETE: CPT | Performed by: NURSE PRACTITIONER

## (undated) DEVICE — REM POLYHESIVE ADULT PATIENT RETURN ELECTRODE: Brand: VALLEYLAB

## (undated) DEVICE — GYN CDS: Brand: MEDLINE INDUSTRIES, INC.

## (undated) DEVICE — NEEDLE SPINAL 22X3-1/2 BLK

## (undated) DEVICE — CAUTERY PENCIL

## (undated) DEVICE — KENDALL SCD EXPRESS SLEEVES, KNEE LENGTH, MEDIUM: Brand: KENDALL SCD

## (undated) DEVICE — ELECTRODE LOOP GREEN 15X12

## (undated) DEVICE — SWAB PROCTO 16\" NON-STERILE

## (undated) DEVICE — SOL  .9 1000ML BTL

## (undated) DEVICE — ELECTRODE BALL 5MM DBL-511

## (undated) NOTE — LETTER
Kaylyn Arizmendi, :1995    CONSENT FOR PROCEDURE/SEDATION    1. I authorize the performance upon Kaylyn Arizmendi  the following: Colposcopy with Endocervical curettage    2.  I authorize Dr. Caryn Espinoza MD (and whomever is designated as the doctor Relationship to patient: ____________________________________________    Witness: _________________________________________ Date:___________     Physician Signature: _______________________________ Date:___________

## (undated) NOTE — LETTER
Date & Time: 12/3/2022, 11:42 AM  Patient: Jazmin Garvey  Encounter Provider(s):    ALANA Nelson       To Whom It May Concern:    Lita Vieyra was seen and treated in our department on 12/3/2022. She should not return to work until fever free for 24 hours without medications.     If you have any questions or concerns, please do not hesitate to call.        _____________________________  Physician/APC Signature

## (undated) NOTE — LETTER
November 23, 2020    Patient: René Emmanuel   YOB: 1995     Dear Employer,  At [facility], we are taking special precautions and doing everything we can to prevent the spread of COVID-19 (coronavirus disease 2019).  During th ? To maximize the safety of employees, employers and clients, we encourage employers to allow self-identified high-risk patients to work from home if it is at all feasible for them to do so.  COVID-19 is especially risky for high-risk patients (including, b

## (undated) NOTE — LETTER
Jayne Tristan, :1995    CONSENT FOR PROCEDURE/SEDATION    1. I authorize the performance upon Julyaziza Kun  the following: Colposcopy with biopsy and Endocervical curettage    2.  I authorize Dr. Nadine Ramos MD (and whomever is designated as the Relationship to patient: ____________________________________________    Witness: _________________________________________ Date:___________     Physician Signature: _______________________________ Date:___________

## (undated) NOTE — LETTER
Date & Time: 11/25/2023, 3:38 PM  Patient: Marcia Martinez  Encounter Provider(s):    FRANCINE Raines       To Whom It May Concern:    Francis Link was seen and treated in our department on 11/25/2023. You may return to work on Monday if feeling better and fever free.     If you have any questions or concerns, please do not hesitate to call.        _____________________________  Physician/APC Signature

## (undated) NOTE — MR AVS SNAPSHOT
After Visit Summary   8/18/2021    John Carey    MRN: [id number]           Visit Information     Date & Time  8/18/2021 10:00 AM Provider  Magnus Robert MD Department  [facility] [address]  Dept.  Phone  [phone] headache and pink eye. The cost for a Video Visit is currently $35.         If you receive a survey from [employer], please take a few minutes to complete it and provide feedback.  We strive to deliver the best patient experience and are looking for ways available by appointment Average cost  $120*     EMERGENCY ROOM Life-threatening emergencies needing immediate intervention at a hospital emergency room.  Average cost  $2,300*   *Cost varies based on your insurance coverage  For more information about hour

## (undated) NOTE — LETTER
Date & Time: 4/15/2025, 2:57 PM  Patient: Joan Escalante  Encounter Provider(s):    Sabine Monroy APRN       To Whom It May Concern:    Joan Escalante was seen and treated in our department on 4/15/2025. She can return to work on 4/16/2025.    If you have any questions or concerns, please do not hesitate to call.        ___S. Porfirio__________________________  Physician/APC Signature